# Patient Record
Sex: MALE | Race: WHITE | Employment: FULL TIME | URBAN - NONMETROPOLITAN AREA
[De-identification: names, ages, dates, MRNs, and addresses within clinical notes are randomized per-mention and may not be internally consistent; named-entity substitution may affect disease eponyms.]

---

## 2017-01-09 ENCOUNTER — APPOINTMENT (OUTPATIENT)
Dept: GENERAL RADIOLOGY | Age: 22
End: 2017-01-09
Payer: MEDICAID

## 2017-01-09 ENCOUNTER — HOSPITAL ENCOUNTER (EMERGENCY)
Age: 22
Discharge: HOME | End: 2017-01-10
Attending: EMERGENCY MEDICINE
Payer: MEDICAID

## 2017-01-09 DIAGNOSIS — R07.9 CHEST PAIN, UNSPECIFIED TYPE: Primary | ICD-10-CM

## 2017-01-09 DIAGNOSIS — F19.10 SUBSTANCE ABUSE (HCC): ICD-10-CM

## 2017-01-09 LAB
ALBUMIN SERPL-MCNC: 5.1 G/DL (ref 3.5–5.2)
ALP BLD-CCNC: 78 U/L (ref 40–130)
ALT SERPL-CCNC: 26 U/L (ref 5–41)
AMPHETAMINE SCREEN, URINE: POSITIVE
ANION GAP SERPL CALCULATED.3IONS-SCNC: 23 MMOL/L (ref 7–19)
AST SERPL-CCNC: 36 U/L (ref 5–40)
BARBITURATE SCREEN URINE: NEGATIVE
BASOPHILS ABSOLUTE: 0 K/UL (ref 0–0.2)
BASOPHILS RELATIVE PERCENT: 0.1 % (ref 0–1)
BENZODIAZEPINE SCREEN, URINE: NEGATIVE
BILIRUB SERPL-MCNC: 0.8 MG/DL (ref 0.2–1.2)
BUN BLDV-MCNC: 14 MG/DL (ref 6–20)
CALCIUM SERPL-MCNC: 9.9 MG/DL (ref 8.6–10)
CANNABINOID SCREEN URINE: POSITIVE
CHLORIDE BLD-SCNC: 96 MMOL/L (ref 98–111)
CO2: 21 MMOL/L (ref 22–29)
COCAINE METABOLITE SCREEN URINE: NEGATIVE
CREAT SERPL-MCNC: 0.9 MG/DL (ref 0.5–1.2)
EOSINOPHILS ABSOLUTE: 0.1 K/UL (ref 0–0.6)
EOSINOPHILS RELATIVE PERCENT: 0.6 % (ref 0–5)
ETHANOL: <10 MG/DL (ref 0–0.08)
GFR NON-AFRICAN AMERICAN: >60
GLOBULIN: 2.8 G/DL
GLUCOSE BLD-MCNC: 92 MG/DL (ref 74–109)
HCT VFR BLD CALC: 50.3 % (ref 42–52)
HEMOGLOBIN: 17.7 G/DL (ref 14–18)
LYMPHOCYTES ABSOLUTE: 3.9 K/UL (ref 1.1–4.5)
LYMPHOCYTES RELATIVE PERCENT: 23.7 % (ref 20–40)
Lab: ABNORMAL
MCH RBC QN AUTO: 29.2 PG (ref 27–31)
MCHC RBC AUTO-ENTMCNC: 35.2 G/DL (ref 33–37)
MCV RBC AUTO: 83 FL (ref 80–94)
MONOCYTES ABSOLUTE: 1 K/UL (ref 0–0.9)
MONOCYTES RELATIVE PERCENT: 6 % (ref 0–10)
NEUTROPHILS ABSOLUTE: 11.4 K/UL (ref 1.5–7.5)
NEUTROPHILS RELATIVE PERCENT: 69.4 % (ref 50–65)
OPIATE SCREEN URINE: NEGATIVE
PDW BLD-RTO: 12.1 % (ref 11.5–14.5)
PLATELET # BLD: 300 K/UL (ref 130–400)
PMV BLD AUTO: 9.7 FL (ref 7.4–10.4)
POTASSIUM SERPL-SCNC: 2.9 MMOL/L (ref 3.5–5)
RBC # BLD: 6.06 M/UL (ref 4.7–6.1)
SODIUM BLD-SCNC: 140 MMOL/L (ref 136–145)
TOTAL PROTEIN: 7.9 G/DL (ref 6.6–8.7)
TROPONIN: <0.01 NG/ML (ref 0–0.03)
TSH SERPL DL<=0.05 MIU/L-ACNC: 2.82 UIU/ML (ref 0.27–4.2)
WBC # BLD: 16.4 K/UL (ref 4.8–10.8)

## 2017-01-09 PROCEDURE — 99285 EMERGENCY DEPT VISIT HI MDM: CPT

## 2017-01-09 PROCEDURE — 2580000003 HC RX 258: Performed by: EMERGENCY MEDICINE

## 2017-01-09 PROCEDURE — 6370000000 HC RX 637 (ALT 250 FOR IP): Performed by: EMERGENCY MEDICINE

## 2017-01-09 PROCEDURE — 93005 ELECTROCARDIOGRAM TRACING: CPT

## 2017-01-09 PROCEDURE — 80053 COMPREHEN METABOLIC PANEL: CPT

## 2017-01-09 PROCEDURE — 85025 COMPLETE CBC W/AUTO DIFF WBC: CPT

## 2017-01-09 PROCEDURE — 36415 COLL VENOUS BLD VENIPUNCTURE: CPT

## 2017-01-09 PROCEDURE — G0480 DRUG TEST DEF 1-7 CLASSES: HCPCS

## 2017-01-09 PROCEDURE — 84484 ASSAY OF TROPONIN QUANT: CPT

## 2017-01-09 PROCEDURE — 84443 ASSAY THYROID STIM HORMONE: CPT

## 2017-01-09 PROCEDURE — 71010 XR CHEST PORTABLE: CPT

## 2017-01-09 PROCEDURE — 6360000002 HC RX W HCPCS: Performed by: EMERGENCY MEDICINE

## 2017-01-09 RX ORDER — POTASSIUM CHLORIDE 7.45 MG/ML
10 INJECTION INTRAVENOUS ONCE
Status: COMPLETED | OUTPATIENT
Start: 2017-01-09 | End: 2017-01-09

## 2017-01-09 RX ORDER — ASPIRIN 325 MG
325 TABLET ORAL ONCE
Status: COMPLETED | OUTPATIENT
Start: 2017-01-09 | End: 2017-01-09

## 2017-01-09 RX ORDER — LORAZEPAM 2 MG/ML
1 INJECTION INTRAMUSCULAR ONCE
Status: COMPLETED | OUTPATIENT
Start: 2017-01-09 | End: 2017-01-09

## 2017-01-09 RX ORDER — 0.9 % SODIUM CHLORIDE 0.9 %
1000 INTRAVENOUS SOLUTION INTRAVENOUS ONCE
Status: COMPLETED | OUTPATIENT
Start: 2017-01-09 | End: 2017-01-10

## 2017-01-09 RX ORDER — POTASSIUM CHLORIDE 20 MEQ/1
40 TABLET, EXTENDED RELEASE ORAL ONCE
Status: COMPLETED | OUTPATIENT
Start: 2017-01-09 | End: 2017-01-09

## 2017-01-09 RX ADMIN — LORAZEPAM 1 MG: 2 INJECTION INTRAMUSCULAR; INTRAVENOUS at 21:27

## 2017-01-09 RX ADMIN — SODIUM CHLORIDE 1000 ML: 9 INJECTION, SOLUTION INTRAVENOUS at 21:26

## 2017-01-09 RX ADMIN — POTASSIUM CHLORIDE 10 MEQ: 7.46 INJECTION, SOLUTION INTRAVENOUS at 22:10

## 2017-01-09 RX ADMIN — POTASSIUM CHLORIDE 40 MEQ: 20 TABLET, EXTENDED RELEASE ORAL at 22:10

## 2017-01-09 RX ADMIN — ASPIRIN 325 MG ORAL TABLET 325 MG: 325 PILL ORAL at 21:27

## 2017-01-09 ASSESSMENT — ENCOUNTER SYMPTOMS
COUGH: 0
SHORTNESS OF BREATH: 1

## 2017-01-09 ASSESSMENT — PAIN SCALES - GENERAL: PAINLEVEL_OUTOF10: 10

## 2017-01-10 VITALS
HEIGHT: 67 IN | TEMPERATURE: 98.2 F | SYSTOLIC BLOOD PRESSURE: 118 MMHG | OXYGEN SATURATION: 99 % | DIASTOLIC BLOOD PRESSURE: 80 MMHG | BODY MASS INDEX: 21.97 KG/M2 | WEIGHT: 140 LBS | HEART RATE: 101 BPM | RESPIRATION RATE: 18 BRPM

## 2017-01-10 PROCEDURE — 96365 THER/PROPH/DIAG IV INF INIT: CPT

## 2017-01-10 PROCEDURE — 99283 EMERGENCY DEPT VISIT LOW MDM: CPT | Performed by: EMERGENCY MEDICINE

## 2017-01-10 PROCEDURE — 96375 TX/PRO/DX INJ NEW DRUG ADDON: CPT

## 2017-01-10 PROCEDURE — 80307 DRUG TEST PRSMV CHEM ANLYZR: CPT

## 2017-01-12 LAB
EKG P AXIS: 76 DEGREES
EKG P-R INTERVAL: 112 MS
EKG Q-T INTERVAL: 316 MS
EKG QRS DURATION: 82 MS
EKG QTC CALCULATION (BAZETT): 408 MS
EKG T AXIS: -57 DEGREES

## 2017-02-14 ENCOUNTER — HOSPITAL ENCOUNTER (EMERGENCY)
Facility: HOSPITAL | Age: 22
Discharge: HOME | End: 2017-02-14
Admitting: FAMILY MEDICINE

## 2017-02-14 ENCOUNTER — APPOINTMENT (OUTPATIENT)
Dept: CT IMAGING | Facility: HOSPITAL | Age: 22
End: 2017-02-14

## 2017-02-14 VITALS
HEART RATE: 85 BPM | RESPIRATION RATE: 20 BRPM | SYSTOLIC BLOOD PRESSURE: 122 MMHG | WEIGHT: 145 LBS | BODY MASS INDEX: 22.76 KG/M2 | HEIGHT: 67 IN | OXYGEN SATURATION: 95 % | DIASTOLIC BLOOD PRESSURE: 69 MMHG | TEMPERATURE: 98.3 F

## 2017-02-14 DIAGNOSIS — R10.13 EPIGASTRIC PAIN: Primary | ICD-10-CM

## 2017-02-14 DIAGNOSIS — K52.9 GASTROENTERITIS: ICD-10-CM

## 2017-02-14 LAB
ALBUMIN SERPL-MCNC: 4.8 G/DL (ref 3.5–5)
ALBUMIN/GLOB SERPL: 1.7 G/DL (ref 1.1–2.5)
ALP SERPL-CCNC: 88 U/L (ref 24–120)
ALT SERPL W P-5'-P-CCNC: 35 U/L (ref 0–54)
AMYLASE SERPL-CCNC: 85 U/L (ref 30–110)
ANION GAP SERPL CALCULATED.3IONS-SCNC: 10 MMOL/L (ref 4–13)
AST SERPL-CCNC: 35 U/L (ref 7–45)
BASOPHILS # BLD AUTO: 0.01 10*3/MM3 (ref 0–0.2)
BASOPHILS NFR BLD AUTO: 0.2 % (ref 0–2)
BILIRUB SERPL-MCNC: 0.4 MG/DL (ref 0.1–1)
BILIRUB UR QL STRIP: NEGATIVE
BUN BLD-MCNC: 12 MG/DL (ref 5–21)
BUN/CREAT SERPL: 12.2 (ref 7–25)
CALCIUM SPEC-SCNC: 10.1 MG/DL (ref 8.4–10.4)
CHLORIDE SERPL-SCNC: 99 MMOL/L (ref 98–110)
CLARITY UR: ABNORMAL
CO2 SERPL-SCNC: 31 MMOL/L (ref 24–31)
COLOR UR: YELLOW
CREAT BLD-MCNC: 0.98 MG/DL (ref 0.5–1.4)
DEPRECATED RDW RBC AUTO: 41 FL (ref 40–54)
EOSINOPHIL # BLD AUTO: 0.15 10*3/MM3 (ref 0–0.7)
EOSINOPHIL NFR BLD AUTO: 2.3 % (ref 0–4)
ERYTHROCYTE [DISTWIDTH] IN BLOOD BY AUTOMATED COUNT: 13.3 % (ref 12–15)
GFR SERPL CREATININE-BSD FRML MDRD: 95 ML/MIN/1.73
GLOBULIN UR ELPH-MCNC: 2.8 GM/DL
GLUCOSE BLD-MCNC: 87 MG/DL (ref 70–100)
GLUCOSE UR STRIP-MCNC: NEGATIVE MG/DL
HCT VFR BLD AUTO: 45.7 % (ref 40–52)
HGB BLD-MCNC: 15.9 G/DL (ref 14–18)
HGB UR QL STRIP.AUTO: NEGATIVE
IMM GRANULOCYTES # BLD: 0.01 10*3/MM3 (ref 0–0.03)
IMM GRANULOCYTES NFR BLD: 0.2 % (ref 0–5)
KETONES UR QL STRIP: NEGATIVE
LEUKOCYTE ESTERASE UR QL STRIP.AUTO: NEGATIVE
LIPASE SERPL-CCNC: 82 U/L (ref 23–203)
LYMPHOCYTES # BLD AUTO: 2.39 10*3/MM3 (ref 0.72–4.86)
LYMPHOCYTES NFR BLD AUTO: 36.3 % (ref 15–45)
MCH RBC QN AUTO: 29.6 PG (ref 28–32)
MCHC RBC AUTO-ENTMCNC: 34.8 G/DL (ref 33–36)
MCV RBC AUTO: 85.1 FL (ref 82–95)
MONOCYTES # BLD AUTO: 0.45 10*3/MM3 (ref 0.19–1.3)
MONOCYTES NFR BLD AUTO: 6.8 % (ref 4–12)
NEUTROPHILS # BLD AUTO: 3.58 10*3/MM3 (ref 1.87–8.4)
NEUTROPHILS NFR BLD AUTO: 54.2 % (ref 39–78)
NITRITE UR QL STRIP: NEGATIVE
PH UR STRIP.AUTO: 8 [PH] (ref 5–8)
PLATELET # BLD AUTO: 284 10*3/MM3 (ref 130–400)
PMV BLD AUTO: 9.9 FL (ref 6–12)
POTASSIUM BLD-SCNC: 3.9 MMOL/L (ref 3.5–5.3)
PROT SERPL-MCNC: 7.6 G/DL (ref 6.3–8.7)
PROT UR QL STRIP: ABNORMAL
RBC # BLD AUTO: 5.37 10*6/MM3 (ref 4.8–5.9)
SODIUM BLD-SCNC: 140 MMOL/L (ref 135–145)
SP GR UR STRIP: 1.03 (ref 1–1.03)
UROBILINOGEN UR QL STRIP: ABNORMAL
WBC NRBC COR # BLD: 6.59 10*3/MM3 (ref 4.8–10.8)

## 2017-02-14 PROCEDURE — 80053 COMPREHEN METABOLIC PANEL: CPT | Performed by: NURSE PRACTITIONER

## 2017-02-14 PROCEDURE — 74177 CT ABD & PELVIS W/CONTRAST: CPT

## 2017-02-14 PROCEDURE — 85025 COMPLETE CBC W/AUTO DIFF WBC: CPT | Performed by: NURSE PRACTITIONER

## 2017-02-14 PROCEDURE — 96361 HYDRATE IV INFUSION ADD-ON: CPT

## 2017-02-14 PROCEDURE — 99283 EMERGENCY DEPT VISIT LOW MDM: CPT

## 2017-02-14 PROCEDURE — 25010000002 PROMETHAZINE PER 50 MG: Performed by: NURSE PRACTITIONER

## 2017-02-14 PROCEDURE — 82150 ASSAY OF AMYLASE: CPT | Performed by: NURSE PRACTITIONER

## 2017-02-14 PROCEDURE — 81003 URINALYSIS AUTO W/O SCOPE: CPT | Performed by: NURSE PRACTITIONER

## 2017-02-14 PROCEDURE — 96375 TX/PRO/DX INJ NEW DRUG ADDON: CPT

## 2017-02-14 PROCEDURE — 83690 ASSAY OF LIPASE: CPT | Performed by: NURSE PRACTITIONER

## 2017-02-14 PROCEDURE — 0 IOPAMIDOL 61 % SOLUTION: Performed by: NURSE PRACTITIONER

## 2017-02-14 PROCEDURE — 96374 THER/PROPH/DIAG INJ IV PUSH: CPT

## 2017-02-14 RX ORDER — PANTOPRAZOLE SODIUM 40 MG/10ML
40 INJECTION, POWDER, LYOPHILIZED, FOR SOLUTION INTRAVENOUS ONCE
Status: COMPLETED | OUTPATIENT
Start: 2017-02-14 | End: 2017-02-14

## 2017-02-14 RX ORDER — ONDANSETRON 4 MG/1
4 TABLET, ORALLY DISINTEGRATING ORAL EVERY 6 HOURS PRN
Qty: 10 TABLET | Refills: 0 | Status: SHIPPED | OUTPATIENT
Start: 2017-02-14

## 2017-02-14 RX ORDER — PANTOPRAZOLE SODIUM 20 MG/1
20 TABLET, DELAYED RELEASE ORAL DAILY
Qty: 20 TABLET | Refills: 0 | Status: SHIPPED | OUTPATIENT
Start: 2017-02-14 | End: 2021-08-26

## 2017-02-14 RX ORDER — PROMETHAZINE HYDROCHLORIDE 25 MG/ML
12.5 INJECTION, SOLUTION INTRAMUSCULAR; INTRAVENOUS ONCE
Status: COMPLETED | OUTPATIENT
Start: 2017-02-14 | End: 2017-02-14

## 2017-02-14 RX ADMIN — PANTOPRAZOLE SODIUM 40 MG: 40 INJECTION, POWDER, FOR SOLUTION INTRAVENOUS at 20:09

## 2017-02-14 RX ADMIN — PROMETHAZINE HYDROCHLORIDE 12.5 MG: 25 INJECTION INTRAMUSCULAR; INTRAVENOUS at 20:06

## 2017-02-14 RX ADMIN — SODIUM CHLORIDE 1000 ML: 9 INJECTION, SOLUTION INTRAVENOUS at 20:05

## 2017-02-14 RX ADMIN — IOPAMIDOL 100 ML: 612 INJECTION, SOLUTION INTRAVENOUS at 21:13

## 2017-02-15 LAB
HOLD SPECIMEN: NORMAL
WHOLE BLOOD HOLD SPECIMEN: NORMAL
WHOLE BLOOD HOLD SPECIMEN: NORMAL

## 2017-02-15 NOTE — ED PROVIDER NOTES
Subjective   HPI Comments: Patient's 21-year-old white male presents with upper abdominal pain with nausea that started earlier today.  He states his gotten worse throughout the day.  He states that he has not vomited but has continued to have nausea.  He denies any fever or chills.    Patient is a 21 y.o. male presenting with abdominal pain.   History provided by:  Patient   used: No    Abdominal Pain       Review of Systems   Constitutional: Negative.    HENT: Negative.    Eyes: Negative.    Respiratory: Negative.    Cardiovascular: Negative.    Gastrointestinal: Positive for abdominal pain.        Pt states that he has had upper abd pain that started earlier today. He states that the pain has gotten worse throughout the day and that he has had nausea as well. He denies vomiting. He denies fever or chills.    Endocrine: Negative.    Genitourinary: Negative.    Musculoskeletal: Negative.    Skin: Negative.    Allergic/Immunologic: Negative.    Neurological: Negative.    Hematological: Negative.    Psychiatric/Behavioral: Negative.    All other systems reviewed and are negative.      Past Medical History   Diagnosis Date   • Patient denies medical problems        No Known Allergies    History reviewed. No pertinent past surgical history.    History reviewed. No pertinent family history.    Social History     Social History   • Marital status: Single     Spouse name: N/A   • Number of children: N/A   • Years of education: N/A     Social History Main Topics   • Smoking status: Current Every Day Smoker     Packs/day: 1.00     Types: Cigarettes   • Smokeless tobacco: None   • Alcohol use Yes      Comment: occasional   • Drug use: No   • Sexual activity: Not Asked     Other Topics Concern   • None     Social History Narrative   • None       Prior to Admission medications    Not on File       Visit Vitals   • /69 (BP Location: Right arm, Patient Position: Sitting)   • Pulse 85   • Temp 98.3 °F  "(36.8 °C) (Tympanic)   • Resp 20   • Ht 67\" (170.2 cm)   • Wt 145 lb (65.8 kg)   • SpO2 95%   • BMI 22.71 kg/m2       Objective   Physical Exam   Constitutional: He is oriented to person, place, and time. He appears well-developed and well-nourished. He is active. No distress.   HENT:   Head: Normocephalic. Head is without raccoon's eyes, without Randhawa's sign, without abrasion, without contusion and without laceration.   Right Ear: Tympanic membrane and external ear normal.   Left Ear: Tympanic membrane and external ear normal.   Nose: Nose normal.   Mouth/Throat: Oropharynx is clear and moist.   Eyes: Conjunctivae, EOM and lids are normal. Pupils are equal, round, and reactive to light.   Neck: Trachea normal and normal range of motion. Neck supple. Normal carotid pulses and no JVD present. No spinous process tenderness and no muscular tenderness present. No rigidity. No tracheal deviation and normal range of motion present.   Cardiovascular: Normal rate, regular rhythm, normal heart sounds, intact distal pulses and normal pulses.    Pulmonary/Chest: Effort normal and breath sounds normal. No accessory muscle usage or stridor. No respiratory distress. He exhibits no mass, no tenderness, no bony tenderness, no laceration, no crepitus, no deformity and no swelling.   Abdominal: Soft. Normal appearance, normal aorta and bowel sounds are normal. He exhibits no distension and no pulsatile midline mass. There is no tenderness.   Moderate tenderness midepigastric area, ruq. No guarding or rebound noted. abd soft, nondistended    Musculoskeletal: Normal range of motion. He exhibits no edema, tenderness or deformity.        Cervical back: Normal. He exhibits no tenderness, no bony tenderness, no deformity and no pain.        Thoracic back: Normal. He exhibits no tenderness, no bony tenderness, no pain and no spasm.        Lumbar back: Normal. He exhibits normal range of motion, no tenderness, no bony tenderness and no " deformity.   Neurological: He is alert and oriented to person, place, and time. He has normal strength and normal reflexes. He displays normal reflexes. No cranial nerve deficit or sensory deficit. He exhibits normal muscle tone. GCS eye subscore is 4. GCS verbal subscore is 5. GCS motor subscore is 6.   Skin: Skin is warm, dry and intact. He is not diaphoretic. No pallor.   Psychiatric: He has a normal mood and affect. His speech is normal and behavior is normal.   Nursing note and vitals reviewed.      Procedures         Lab Results (last 24 hours)     Procedure Component Value Units Date/Time    CBC & Differential [[id number]] Collected:  02/14/17 1943    Specimen:  Blood Updated:  02/14/17 2020    Narrative:       The following orders were created for panel order CBC & Differential.  Procedure                               Abnormality         Status                     ---------                               -----------         ------                     CBC Auto Differential[[id number]]         Normal              Final result                 Please view results for these tests on the individual orders.    Comprehensive Metabolic Panel [[id number]] Collected:  02/14/17 1943    Specimen:  Blood from Arm, Right Updated:  02/14/17 2031     Glucose 87 mg/dL      BUN 12 mg/dL      Creatinine 0.98 mg/dL      Sodium 140 mmol/L      Potassium 3.9 mmol/L      Chloride 99 mmol/L      CO2 31.0 mmol/L      Calcium 10.1 mg/dL      Total Protein 7.6 g/dL      Albumin 4.80 g/dL      ALT (SGPT) 35 U/L      AST (SGOT) 35 U/L      Alkaline Phosphatase 88 U/L      Total Bilirubin 0.4 mg/dL      eGFR Non African Amer 95 mL/min/1.73      Globulin 2.8 gm/dL      A/G Ratio 1.7 g/dL      BUN/Creatinine Ratio 12.2      Anion Gap 10.0 mmol/L     Amylase [[id number]]  (Normal) Collected:  02/14/17 1943    Specimen:  Blood from Arm, Right Updated:  02/14/17 2031     Amylase 85 U/L     Lipase [[id number]]  (Normal) Collected:  02/14/17 1943     Specimen:  Blood from Arm, Right Updated:  02/14/17 2031     Lipase 82 U/L     CBC Auto Differential [[id number]]  (Normal) Collected:  02/14/17 1943    Specimen:  Blood from Arm, Right Updated:  02/14/17 2020     WBC 6.59 10*3/mm3      RBC 5.37 10*6/mm3      Hemoglobin 15.9 g/dL      Hematocrit 45.7 %      MCV 85.1 fL      MCH 29.6 pg      MCHC 34.8 g/dL      RDW 13.3 %      RDW-SD 41.0 fl      MPV 9.9 fL      Platelets 284 10*3/mm3      Neutrophil % 54.2 %      Lymphocyte % 36.3 %      Monocyte % 6.8 %      Eosinophil % 2.3 %      Basophil % 0.2 %      Immature Grans % 0.2 %      Neutrophils, Absolute 3.58 10*3/mm3      Lymphocytes, Absolute 2.39 10*3/mm3      Monocytes, Absolute 0.45 10*3/mm3      Eosinophils, Absolute 0.15 10*3/mm3      Basophils, Absolute 0.01 10*3/mm3      Immature Grans, Absolute 0.01 10*3/mm3     Urinalysis With / Culture If Indicated [[id number]]  (Abnormal) Collected:  02/14/17 1946    Specimen:  Urine from Urine, Clean Catch Updated:  02/14/17 2033     Color, UA Yellow      Appearance, UA Turbid (A)      pH, UA 8.0      Specific Gravity, UA 1.026      Glucose, UA Negative      Ketones, UA Negative      Bilirubin, UA Negative      Blood, UA Negative      Protein, UA Trace (A)      Leuk Esterase, UA Negative      Nitrite, UA Negative      Urobilinogen, UA 1.0 E.U./dL     Narrative:       Urine microscopic not indicated.          CT Abdomen Pelvis With Contrast   ED Interpretation   Findings suggestive of mild gastroenteritis, no bowel   obstruction is identified. There is contraction of the gallbladder. The   appendix appears within normal limits. No free fluid is or free air is   identified.   This report was finalized on 02/14/2017 21:32 by Dr. Seth Maldonado MD.      Final Result   Findings suggestive of mild gastroenteritis, no bowel   obstruction is identified. There is contraction of the gallbladder. The   appendix appears within normal limits. No free fluid is or free air is    identified.   This report was finalized on 02/14/2017 21:32 by Dr. Seth Maldonado MD.      US gallbladder    (Results Pending)   NM HIDA scan with pharmacological intervention    (Results Pending)       ED Course  ED Course   Comment By Time   Reeval pt - states that he is feeling much better. No further vomiting since has been in the er. Reviewed test results with pt. Advised to avoid fried or fatty foods. Will order o/p u/s of gallbladder and hida scan  Jackie Stuart, APRROBERT 02/14 8654          MDM  Number of Diagnoses or Management Options  Epigastric pain: new and requires workup  Gastroenteritis: minor     Amount and/or Complexity of Data Reviewed  Clinical lab tests: ordered and reviewed  Tests in the radiology section of CPT®: ordered and reviewed  Independent visualization of images, tracings, or specimens: yes    Risk of Complications, Morbidity, and/or Mortality  Presenting problems: low  Diagnostic procedures: low  Management options: minimal    Patient Progress  Patient progress: stable      Final diagnoses:   Epigastric pain   Gastroenteritis          Jackie Stuart, KEMAR  02/14/17 7880

## 2017-02-25 ENCOUNTER — HOSPITAL ENCOUNTER (EMERGENCY)
Facility: HOSPITAL | Age: 22
Discharge: HOME | End: 2017-02-25
Attending: FAMILY MEDICINE | Admitting: FAMILY MEDICINE

## 2017-02-25 ENCOUNTER — APPOINTMENT (OUTPATIENT)
Dept: GENERAL RADIOLOGY | Facility: HOSPITAL | Age: 22
End: 2017-02-25

## 2017-02-25 VITALS
BODY MASS INDEX: 21.19 KG/M2 | RESPIRATION RATE: 14 BRPM | TEMPERATURE: 98.9 F | SYSTOLIC BLOOD PRESSURE: 122 MMHG | WEIGHT: 135 LBS | HEART RATE: 98 BPM | DIASTOLIC BLOOD PRESSURE: 76 MMHG | HEIGHT: 67 IN | OXYGEN SATURATION: 98 %

## 2017-02-25 DIAGNOSIS — S50.01XA CONTUSION OF RIGHT ELBOW, INITIAL ENCOUNTER: Primary | ICD-10-CM

## 2017-02-25 PROCEDURE — 73090 X-RAY EXAM OF FOREARM: CPT

## 2017-02-25 PROCEDURE — 99283 EMERGENCY DEPT VISIT LOW MDM: CPT

## 2017-02-25 PROCEDURE — 73070 X-RAY EXAM OF ELBOW: CPT

## 2017-02-25 RX ORDER — IBUPROFEN 800 MG/1
800 TABLET ORAL EVERY 8 HOURS PRN
Qty: 25 TABLET | Refills: 0 | Status: SHIPPED | OUTPATIENT
Start: 2017-02-25

## 2018-10-08 ENCOUNTER — APPOINTMENT (OUTPATIENT)
Dept: CT IMAGING | Age: 23
End: 2018-10-08
Payer: MEDICAID

## 2018-10-08 ENCOUNTER — HOSPITAL ENCOUNTER (EMERGENCY)
Age: 23
Discharge: HOME | End: 2018-10-08
Payer: MEDICAID

## 2018-10-08 VITALS
RESPIRATION RATE: 16 BRPM | BODY MASS INDEX: 22.76 KG/M2 | SYSTOLIC BLOOD PRESSURE: 128 MMHG | HEIGHT: 67 IN | TEMPERATURE: 99 F | HEART RATE: 90 BPM | WEIGHT: 145 LBS | DIASTOLIC BLOOD PRESSURE: 80 MMHG | OXYGEN SATURATION: 92 %

## 2018-10-08 DIAGNOSIS — R10.13 ABDOMINAL PAIN, EPIGASTRIC: Primary | ICD-10-CM

## 2018-10-08 DIAGNOSIS — R19.7 DIARRHEA, UNSPECIFIED TYPE: ICD-10-CM

## 2018-10-08 LAB
ALBUMIN SERPL-MCNC: 4.8 G/DL (ref 3.5–5.2)
ALP BLD-CCNC: 65 U/L (ref 40–130)
ALT SERPL-CCNC: 17 U/L (ref 5–41)
ANION GAP SERPL CALCULATED.3IONS-SCNC: 11 MMOL/L (ref 7–19)
AST SERPL-CCNC: 30 U/L (ref 5–40)
BASOPHILS ABSOLUTE: 0 K/UL (ref 0–0.2)
BASOPHILS RELATIVE PERCENT: 0.2 % (ref 0–1)
BILIRUB SERPL-MCNC: 0.4 MG/DL (ref 0.2–1.2)
BILIRUBIN URINE: NEGATIVE
BLOOD, URINE: NEGATIVE
BUN BLDV-MCNC: 13 MG/DL (ref 6–20)
CALCIUM SERPL-MCNC: 9.7 MG/DL (ref 8.6–10)
CHLORIDE BLD-SCNC: 97 MMOL/L (ref 98–111)
CLARITY: CLEAR
CO2: 27 MMOL/L (ref 22–29)
COLOR: YELLOW
CREAT SERPL-MCNC: 0.8 MG/DL (ref 0.5–1.2)
EOSINOPHILS ABSOLUTE: 0 K/UL (ref 0–0.6)
EOSINOPHILS RELATIVE PERCENT: 0.3 % (ref 0–5)
GFR NON-AFRICAN AMERICAN: >60
GLUCOSE BLD-MCNC: 87 MG/DL (ref 74–109)
GLUCOSE URINE: NEGATIVE MG/DL
HCT VFR BLD CALC: 43.9 % (ref 42–52)
HEMOGLOBIN: 14.8 G/DL (ref 14–18)
KETONES, URINE: ABNORMAL MG/DL
LEUKOCYTE ESTERASE, URINE: NEGATIVE
LIPASE: 29 U/L (ref 13–60)
LYMPHOCYTES ABSOLUTE: 1.2 K/UL (ref 1.1–4.5)
LYMPHOCYTES RELATIVE PERCENT: 12.2 % (ref 20–40)
MCH RBC QN AUTO: 28.5 PG (ref 27–31)
MCHC RBC AUTO-ENTMCNC: 33.7 G/DL (ref 33–37)
MCV RBC AUTO: 84.6 FL (ref 80–94)
MONOCYTES ABSOLUTE: 0.6 K/UL (ref 0–0.9)
MONOCYTES RELATIVE PERCENT: 6 % (ref 0–10)
NEUTROPHILS ABSOLUTE: 7.8 K/UL (ref 1.5–7.5)
NEUTROPHILS RELATIVE PERCENT: 81.1 % (ref 50–65)
NITRITE, URINE: NEGATIVE
PDW BLD-RTO: 11.3 % (ref 11.5–14.5)
PH UA: 6
PLATELET # BLD: 225 K/UL (ref 130–400)
PMV BLD AUTO: 9.3 FL (ref 9.4–12.4)
POTASSIUM SERPL-SCNC: 3.8 MMOL/L (ref 3.5–5)
PROTEIN UA: NEGATIVE MG/DL
RBC # BLD: 5.19 M/UL (ref 4.7–6.1)
SODIUM BLD-SCNC: 135 MMOL/L (ref 136–145)
SPECIFIC GRAVITY UA: 1.03
TOTAL PROTEIN: 7.5 G/DL (ref 6.6–8.7)
URINE REFLEX TO CULTURE: ABNORMAL
UROBILINOGEN, URINE: 0.2 E.U./DL
WBC # BLD: 9.7 K/UL (ref 4.8–10.8)

## 2018-10-08 PROCEDURE — 99284 EMERGENCY DEPT VISIT MOD MDM: CPT | Performed by: NURSE PRACTITIONER

## 2018-10-08 PROCEDURE — 2580000003 HC RX 258: Performed by: NURSE PRACTITIONER

## 2018-10-08 PROCEDURE — 74177 CT ABD & PELVIS W/CONTRAST: CPT

## 2018-10-08 PROCEDURE — 80053 COMPREHEN METABOLIC PANEL: CPT

## 2018-10-08 PROCEDURE — 6360000004 HC RX CONTRAST MEDICATION: Performed by: NURSE PRACTITIONER

## 2018-10-08 PROCEDURE — 99284 EMERGENCY DEPT VISIT MOD MDM: CPT

## 2018-10-08 PROCEDURE — 83690 ASSAY OF LIPASE: CPT

## 2018-10-08 PROCEDURE — 36415 COLL VENOUS BLD VENIPUNCTURE: CPT

## 2018-10-08 PROCEDURE — 85025 COMPLETE CBC W/AUTO DIFF WBC: CPT

## 2018-10-08 PROCEDURE — 2500000003 HC RX 250 WO HCPCS: Performed by: NURSE PRACTITIONER

## 2018-10-08 PROCEDURE — 96374 THER/PROPH/DIAG INJ IV PUSH: CPT

## 2018-10-08 PROCEDURE — 81003 URINALYSIS AUTO W/O SCOPE: CPT

## 2018-10-08 PROCEDURE — 96375 TX/PRO/DX INJ NEW DRUG ADDON: CPT

## 2018-10-08 PROCEDURE — 6360000002 HC RX W HCPCS: Performed by: NURSE PRACTITIONER

## 2018-10-08 PROCEDURE — S0028 INJECTION, FAMOTIDINE, 20 MG: HCPCS | Performed by: NURSE PRACTITIONER

## 2018-10-08 RX ORDER — SUCRALFATE ORAL 1 G/10ML
1 SUSPENSION ORAL 4 TIMES DAILY
Qty: 1200 ML | Refills: 3 | Status: SHIPPED | OUTPATIENT
Start: 2018-10-08 | End: 2018-11-11

## 2018-10-08 RX ORDER — 0.9 % SODIUM CHLORIDE 0.9 %
1000 INTRAVENOUS SOLUTION INTRAVENOUS ONCE
Status: COMPLETED | OUTPATIENT
Start: 2018-10-08 | End: 2018-10-08

## 2018-10-08 RX ORDER — ONDANSETRON 4 MG/1
4 TABLET, FILM COATED ORAL EVERY 8 HOURS PRN
Qty: 10 TABLET | Refills: 0 | Status: SHIPPED | OUTPATIENT
Start: 2018-10-08 | End: 2018-11-11

## 2018-10-08 RX ORDER — ONDANSETRON 2 MG/ML
4 INJECTION INTRAMUSCULAR; INTRAVENOUS ONCE
Status: COMPLETED | OUTPATIENT
Start: 2018-10-08 | End: 2018-10-08

## 2018-10-08 RX ORDER — FAMOTIDINE 20 MG/1
20 TABLET, FILM COATED ORAL 2 TIMES DAILY
Qty: 60 TABLET | Refills: 3 | Status: SHIPPED | OUTPATIENT
Start: 2018-10-08 | End: 2018-11-11

## 2018-10-08 RX ORDER — MORPHINE SULFATE/0.9% NACL/PF 1 MG/ML
4 SYRINGE (ML) INJECTION ONCE
Status: COMPLETED | OUTPATIENT
Start: 2018-10-08 | End: 2018-10-08

## 2018-10-08 RX ADMIN — SODIUM CHLORIDE 1000 ML: 9 INJECTION, SOLUTION INTRAVENOUS at 20:51

## 2018-10-08 RX ADMIN — ONDANSETRON 4 MG: 2 INJECTION INTRAMUSCULAR; INTRAVENOUS at 20:41

## 2018-10-08 RX ADMIN — Medication 4 MG: at 20:41

## 2018-10-08 RX ADMIN — FAMOTIDINE 20 MG: 10 INJECTION, SOLUTION INTRAVENOUS at 20:41

## 2018-10-08 RX ADMIN — IOPAMIDOL 90 ML: 755 INJECTION, SOLUTION INTRAVENOUS at 21:28

## 2018-10-08 ASSESSMENT — PAIN SCALES - GENERAL
PAINLEVEL_OUTOF10: 7
PAINLEVEL_OUTOF10: 7
PAINLEVEL_OUTOF10: 3

## 2018-10-08 ASSESSMENT — ENCOUNTER SYMPTOMS
COUGH: 0
DIARRHEA: 1
VOMITING: 0
SHORTNESS OF BREATH: 0
BACK PAIN: 0
WHEEZING: 0
NAUSEA: 0
EYE DISCHARGE: 0
ABDOMINAL PAIN: 1
SORE THROAT: 0

## 2018-10-09 ENCOUNTER — HOSPITAL ENCOUNTER (EMERGENCY)
Age: 23
Discharge: HOME | End: 2018-10-09
Attending: EMERGENCY MEDICINE
Payer: MEDICAID

## 2018-10-09 ENCOUNTER — APPOINTMENT (OUTPATIENT)
Dept: ULTRASOUND IMAGING | Age: 23
End: 2018-10-09
Payer: MEDICAID

## 2018-10-09 VITALS
BODY MASS INDEX: 22.76 KG/M2 | TEMPERATURE: 98.5 F | HEIGHT: 67 IN | OXYGEN SATURATION: 99 % | RESPIRATION RATE: 18 BRPM | HEART RATE: 72 BPM | DIASTOLIC BLOOD PRESSURE: 87 MMHG | WEIGHT: 145 LBS | SYSTOLIC BLOOD PRESSURE: 138 MMHG

## 2018-10-09 DIAGNOSIS — R10.13 ABDOMINAL PAIN, EPIGASTRIC: Primary | ICD-10-CM

## 2018-10-09 LAB
ALBUMIN SERPL-MCNC: 4.1 G/DL (ref 3.5–5.2)
ALP BLD-CCNC: 58 U/L (ref 40–130)
ALT SERPL-CCNC: 14 U/L (ref 5–41)
ANION GAP SERPL CALCULATED.3IONS-SCNC: 11 MMOL/L (ref 7–19)
AST SERPL-CCNC: 22 U/L (ref 5–40)
BASOPHILS ABSOLUTE: 0 K/UL (ref 0–0.2)
BASOPHILS RELATIVE PERCENT: 0.2 % (ref 0–1)
BILIRUB SERPL-MCNC: <0.2 MG/DL (ref 0.2–1.2)
BUN BLDV-MCNC: 13 MG/DL (ref 6–20)
CALCIUM SERPL-MCNC: 9 MG/DL (ref 8.6–10)
CHLORIDE BLD-SCNC: 102 MMOL/L (ref 98–111)
CO2: 26 MMOL/L (ref 22–29)
CREAT SERPL-MCNC: 0.8 MG/DL (ref 0.5–1.2)
EOSINOPHILS ABSOLUTE: 0.1 K/UL (ref 0–0.6)
EOSINOPHILS RELATIVE PERCENT: 1.8 % (ref 0–5)
GFR NON-AFRICAN AMERICAN: >60
GLUCOSE BLD-MCNC: 119 MG/DL (ref 74–109)
HCT VFR BLD CALC: 42 % (ref 42–52)
HEMOGLOBIN: 14 G/DL (ref 14–18)
LIPASE: 37 U/L (ref 13–60)
LYMPHOCYTES ABSOLUTE: 1 K/UL (ref 1.1–4.5)
LYMPHOCYTES RELATIVE PERCENT: 21.5 % (ref 20–40)
MCH RBC QN AUTO: 28.5 PG (ref 27–31)
MCHC RBC AUTO-ENTMCNC: 33.3 G/DL (ref 33–37)
MCV RBC AUTO: 85.4 FL (ref 80–94)
MONOCYTES ABSOLUTE: 0.5 K/UL (ref 0–0.9)
MONOCYTES RELATIVE PERCENT: 10.8 % (ref 0–10)
NEUTROPHILS ABSOLUTE: 3 K/UL (ref 1.5–7.5)
NEUTROPHILS RELATIVE PERCENT: 65.5 % (ref 50–65)
PDW BLD-RTO: 11.6 % (ref 11.5–14.5)
PLATELET # BLD: 196 K/UL (ref 130–400)
PMV BLD AUTO: 9.2 FL (ref 9.4–12.4)
POTASSIUM SERPL-SCNC: 4.3 MMOL/L (ref 3.5–5)
RBC # BLD: 4.92 M/UL (ref 4.7–6.1)
SODIUM BLD-SCNC: 139 MMOL/L (ref 136–145)
TOTAL PROTEIN: 6.6 G/DL (ref 6.6–8.7)
WBC # BLD: 4.5 K/UL (ref 4.8–10.8)

## 2018-10-09 PROCEDURE — 76705 ECHO EXAM OF ABDOMEN: CPT

## 2018-10-09 PROCEDURE — 99284 EMERGENCY DEPT VISIT MOD MDM: CPT | Performed by: EMERGENCY MEDICINE

## 2018-10-09 PROCEDURE — 96374 THER/PROPH/DIAG INJ IV PUSH: CPT

## 2018-10-09 PROCEDURE — 80053 COMPREHEN METABOLIC PANEL: CPT

## 2018-10-09 PROCEDURE — 6370000000 HC RX 637 (ALT 250 FOR IP): Performed by: EMERGENCY MEDICINE

## 2018-10-09 PROCEDURE — 99284 EMERGENCY DEPT VISIT MOD MDM: CPT

## 2018-10-09 PROCEDURE — 85025 COMPLETE CBC W/AUTO DIFF WBC: CPT

## 2018-10-09 PROCEDURE — 36415 COLL VENOUS BLD VENIPUNCTURE: CPT

## 2018-10-09 PROCEDURE — S0028 INJECTION, FAMOTIDINE, 20 MG: HCPCS | Performed by: EMERGENCY MEDICINE

## 2018-10-09 PROCEDURE — 6360000002 HC RX W HCPCS: Performed by: EMERGENCY MEDICINE

## 2018-10-09 PROCEDURE — 83690 ASSAY OF LIPASE: CPT

## 2018-10-09 PROCEDURE — 96375 TX/PRO/DX INJ NEW DRUG ADDON: CPT

## 2018-10-09 PROCEDURE — 2500000003 HC RX 250 WO HCPCS: Performed by: EMERGENCY MEDICINE

## 2018-10-09 RX ORDER — ONDANSETRON 2 MG/ML
4 INJECTION INTRAMUSCULAR; INTRAVENOUS ONCE
Status: COMPLETED | OUTPATIENT
Start: 2018-10-09 | End: 2018-10-09

## 2018-10-09 RX ORDER — MORPHINE SULFATE/0.9% NACL/PF 1 MG/ML
4 SYRINGE (ML) INJECTION ONCE
Status: COMPLETED | OUTPATIENT
Start: 2018-10-09 | End: 2018-10-09

## 2018-10-09 RX ADMIN — Medication 4 MG: at 13:13

## 2018-10-09 RX ADMIN — ONDANSETRON 4 MG: 2 INJECTION INTRAMUSCULAR; INTRAVENOUS at 13:12

## 2018-10-09 RX ADMIN — LIDOCAINE HYDROCHLORIDE: 20 SOLUTION ORAL; TOPICAL at 14:00

## 2018-10-09 RX ADMIN — FAMOTIDINE 20 MG: 10 INJECTION, SOLUTION INTRAVENOUS at 13:13

## 2018-10-09 ASSESSMENT — ENCOUNTER SYMPTOMS
ABDOMINAL PAIN: 1
VOMITING: 0
NAUSEA: 1
RHINORRHEA: 0
SHORTNESS OF BREATH: 0
DIARRHEA: 1
SORE THROAT: 0
BACK PAIN: 0

## 2018-10-09 ASSESSMENT — PAIN DESCRIPTION - PAIN TYPE: TYPE: ACUTE PAIN

## 2018-10-09 ASSESSMENT — PAIN DESCRIPTION - LOCATION: LOCATION: ABDOMEN

## 2018-10-09 ASSESSMENT — PAIN SCALES - GENERAL: PAINLEVEL_OUTOF10: 8

## 2018-10-09 NOTE — ED PROVIDER NOTES
PAST MEDICAL HISTORY     Past Medical History:   Diagnosis Date    Anxiety     H/O lithotripsy     Kidney stone     Tooth ache          SURGICAL HISTORY       Past Surgical History:   Procedure Laterality Date    LITHOTRIPSY      LITHOTRIPSY           CURRENT MEDICATIONS       Previous Medications    FAMOTIDINE (PEPCID) 20 MG TABLET    Take 1 tablet by mouth 2 times daily    ONDANSETRON (ZOFRAN) 4 MG TABLET    Take 1 tablet by mouth every 8 hours as needed for Nausea or Vomiting    SUCRALFATE (CARAFATE) 1 GM/10ML SUSPENSION    Take 10 mLs by mouth 4 times daily       ALLERGIES     Patient has no known allergies. FAMILY HISTORY     History reviewed. No pertinent family history. SOCIAL HISTORY       Social History     Social History    Marital status:      Spouse name: N/A    Number of children: N/A    Years of education: N/A     Social History Main Topics    Smoking status: Current Every Day Smoker     Packs/day: 0.00     Types: E-Cigarettes    Smokeless tobacco: Never Used    Alcohol use No    Drug use: No      Comment: history meth addiction-sober 2 years    Sexual activity: Not Asked     Other Topics Concern    None     Social History Narrative    None       SCREENINGS             PHYSICAL EXAM    (up to 7 for level 4, 8 or more for level 5)     ED Triage Vitals [10/09/18 1213]   BP Temp Temp src Pulse Resp SpO2 Height Weight   (!) 130/93 98.5 °F (36.9 °C) -- 76 18 99 % 5' 7\" (1.702 m) 145 lb (65.8 kg)       Physical Exam   Constitutional: He is oriented to person, place, and time. He appears well-developed and well-nourished. No distress. HENT:   Head: Normocephalic and atraumatic. Eyes: Pupils are equal, round, and reactive to light. Neck: Normal range of motion. Neck supple. Cardiovascular: Normal rate, regular rhythm, normal heart sounds and intact distal pulses. Pulmonary/Chest: Effort normal and breath sounds normal. No respiratory distress. Abdominal: Soft.

## 2018-10-09 NOTE — ED PROVIDER NOTES
[facility] EMERGENCY DEPT  eMERGENCY dEPARTMENT eNCOUnter      Pt Name: Cari Crawford  MRN: [id number]  [place] 1995  Date of evaluation: 10/8/2018  Provider: Toni Rubio, [address]       Chief Complaint   Patient presents with    Abdominal Pain     pain beginning around 1400 today, diarrhea, aching all over         HISTORY OF PRESENT ILLNESS  (Location/Symptom, Timing/Onset, Context/Setting, Quality, Duration, Modifying Factors, Severity.)   Cari Crawford is a 20 y.o. male who presents to the emergency department With chief complaint of mid epigastric abdominal pain that began an hour after eating lunch. Patient reports he had [address] stay and have aspirin casserole at 1 PM and that at 2 PM he began to experience epigastric pain. He reports 3-4 bouts of diarrhea since his epigastric pain. No hematochezia. . No fevers or vomiting. He reports nausea. This is a new chief complaint. The history is provided by the patient. Nursing Notes were reviewed and I agree. REVIEW OF SYSTEMS    (2-9 systems for level 4, 10 or more for level 5)     Review of Systems   Constitutional: Negative for chills and fever. HENT: Negative for congestion, ear pain and sore throat. Eyes: Negative for discharge. Respiratory: Negative for cough, shortness of breath and wheezing. Cardiovascular: Negative for chest pain and palpitations. Gastrointestinal: Positive for abdominal pain and diarrhea. Negative for nausea and vomiting. Genitourinary: Negative for dysuria, frequency, hematuria and urgency. Musculoskeletal: Negative for back pain and neck pain. Skin: Negative for rash. Neurological: Negative for dizziness and headaches. Except as noted above the remainder of the review of systems was reviewed and negative.        PAST MEDICAL HISTORY     Past Medical History:   Diagnosis Date    Anxiety     H/O lithotripsy     Kidney stone     Tooth ache          SURGICAL HISTORY       Past Surgical History:   Procedure Laterality Date    LITHOTRIPSY      LITHOTRIPSY           CURRENT MEDICATIONS       Discharge Medication List as of 10/8/2018 10:02 PM          ALLERGIES     Patient has no known allergies. FAMILY HISTORY     History reviewed. No pertinent family history. SOCIAL HISTORY       Social History     Social History    Marital status:      Spouse name: N/A    Number of children: N/A    Years of education: N/A     Social History Main Topics    Smoking status: Current Every Day Smoker     Packs/day: 0.00     Types: E-Cigarettes    Smokeless tobacco: Never Used    Alcohol use No    Drug use: No      Comment: history meth addiction-sober 2 years    Sexual activity: Not Asked     Other Topics Concern    None     Social History Narrative    None       SCREENINGS    [place] Coma Scale  Eye Opening: Spontaneous  Best Verbal Response: Oriented  Best Motor Response: Obeys commands  Génesis Coma Scale Score: 15      PHYSICAL EXAM    (up to 7 for level 4, 8 or more for level 5)     ED Triage Vitals [10/08/18 1911]   BP Temp Temp src Pulse Resp SpO2 Height Weight   125/80 99.1 °F (37.3 °C) -- 97 18 98 % 5' 7\" (1.702 m) 145 lb (65.8 kg)       Physical Exam   Constitutional: He is oriented to person, place, and time. He appears well-developed and well-nourished. No distress. HENT:   Head: Normocephalic. Right Ear: External ear normal. No drainage. Tympanic membrane is not erythematous. Left Ear: External ear normal. No drainage. Tympanic membrane is not erythematous. Eyes: Pupils are equal, round, and reactive to light. Conjunctivae and EOM are normal.   Neck: Normal range of motion. Cardiovascular: Normal rate, regular rhythm and normal heart sounds. No murmur heard. Pulmonary/Chest: Effort normal and breath sounds normal. No respiratory distress. He has no wheezes. He has no rales. Abdominal: Soft. There is tenderness (RUQ). Musculoskeletal: Normal range of motion.  He exhibits no edema. Lymphadenopathy:     He has no cervical adenopathy. Neurological: He is alert and oriented to person, place, and time. Skin: Skin is warm and dry. No rash noted. He is not diaphoretic. No erythema. No pallor. Psychiatric: He has a normal mood and affect. Nursing note and vitals reviewed. DIAGNOSTIC RESULTS     RADIOLOGY:   Non-plain film images such as CT, Ultrasound and MRI are read by the radiologist. Plain radiographic images are visualized and preliminarily interpreted by No att. providers found with the below findings:        Interpretation per the Radiologist below, if available at the time of this note:    CT ABDOMEN PELVIS W IV CONTRAST Additional Contrast? None   Final Result   1. No acute intra-abdominal or pelvic abnormality is identified. .        Signed by Dr Baldomero Clement on 10/8/2018 9:44 PM          LABS:  Labs Reviewed   CBC WITH AUTO DIFFERENTIAL - Abnormal; Notable for the following:        Result Value    RDW 11.3 (*)     MPV 9.3 (*)     Neutrophils % 81.1 (*)     Lymphocytes % 12.2 (*)     Neutrophils # 7.8 (*)     All other components within normal limits   COMPREHENSIVE METABOLIC PANEL - Abnormal; Notable for the following:     Sodium 135 (*)     Chloride 97 (*)     All other components within normal limits   URINE RT REFLEX TO CULTURE - Abnormal; Notable for the following:     Ketones, Urine TRACE (*)     All other components within normal limits   LIPASE       All other labs were within normal range or not returned as of this dictation.     RE-ASSESSMENT          EMERGENCY DEPARTMENT COURSE and DIFFERENTIAL DIAGNOSIS/MDM:   Vitals:    Vitals:    10/08/18 2051 10/08/18 2101 10/08/18 2138 10/08/18 2200   BP: 122/81 119/78 (!) 131/92 128/80   Pulse:    90   Resp:    16   Temp:    99 °F (37.2 °C)   TempSrc:    Oral   SpO2: 95% 95%  92%   Weight:       Height:               MDM  Number of Diagnoses or Management Options  Abdominal pain, epigastric:   Diarrhea,

## 2018-10-09 NOTE — LETTER
[facility] EMERGENCY DEPT  [facility]  Phone: [phone]               October 9, 2018    Patient: Brian Villafana   YOB: 1995   Date of Visit: 10/9/2018       To Whom It May Concern:    Brian Villafana was seen and treated in our emergency department on 10/9/2018. He may return to work on 10/10/18. No restrictions.       Sincerely,       Haydee Hines RN         Signature:__________________________________

## 2018-11-11 ENCOUNTER — APPOINTMENT (OUTPATIENT)
Dept: GENERAL RADIOLOGY | Age: 23
End: 2018-11-11
Payer: MEDICAID

## 2018-11-11 ENCOUNTER — HOSPITAL ENCOUNTER (EMERGENCY)
Age: 23
Discharge: HOME | End: 2018-11-11
Payer: MEDICAID

## 2018-11-11 VITALS
HEART RATE: 102 BPM | TEMPERATURE: 98.4 F | OXYGEN SATURATION: 97 % | DIASTOLIC BLOOD PRESSURE: 89 MMHG | SYSTOLIC BLOOD PRESSURE: 143 MMHG | RESPIRATION RATE: 18 BRPM

## 2018-11-11 DIAGNOSIS — R93.7 ABNORMAL BONE XRAY: ICD-10-CM

## 2018-11-11 DIAGNOSIS — S93.601A SPRAIN OF RIGHT FOOT, INITIAL ENCOUNTER: Primary | ICD-10-CM
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
## 2018-11-11 PROCEDURE — 99283 EMERGENCY DEPT VISIT LOW MDM: CPT

## 2018-11-11 PROCEDURE — 73630 X-RAY EXAM OF FOOT: CPT

## 2018-11-11 PROCEDURE — 99283 EMERGENCY DEPT VISIT LOW MDM: CPT | Performed by: NURSE PRACTITIONER

## 2018-11-11 PROCEDURE — 90471 IMMUNIZATION ADMIN: CPT | Performed by: NURSE PRACTITIONER

## 2018-11-11 PROCEDURE — 90715 TDAP VACCINE 7 YRS/> IM: CPT | Performed by: NURSE PRACTITIONER

## 2018-11-11 PROCEDURE — 6360000002 HC RX W HCPCS: Performed by: NURSE PRACTITIONER

## 2018-11-11 RX ORDER — NAPROXEN 500 MG/1
500 TABLET ORAL 2 TIMES DAILY
Qty: 20 TABLET | Refills: 0 | Status: SHIPPED | OUTPATIENT
Start: 2018-11-11 | End: 2019-02-24 | Stop reason: ALTCHOICE

## 2018-11-11 RX ADMIN — TETANUS TOXOID, REDUCED DIPHTHERIA TOXOID AND ACELLULAR PERTUSSIS VACCINE, ADSORBED 0.5 ML: 5; 2.5; 8; 8; 2.5 SUSPENSION INTRAMUSCULAR at 15:00

## 2018-11-11 ASSESSMENT — PAIN SCALES - GENERAL: PAINLEVEL_OUTOF10: 10

## 2018-11-11 NOTE — ED PROVIDER NOTES
[facility] EMERGENCY DEPT  eMERGENCYdEPARTMENT eNCOUnter      Pt Name: Ramsey Garcia  MRN: [id number]  [place] 1995  Date of evaluation: 11/11/2018  Provider:Francoise Barker, [address]       Chief Complaint   Patient presents with    Foot Pain     beginning last night         HISTORY OF PRESENT ILLNESS  (Location/Symptom, Timing/Onset, Context/Setting, Quality, Duration, Modifying Factors, Severity.)   Ramsey Garcia is a 20 y.o. male who presents to the emergency department With chief complaint of right dorsal foot pain. Patient reports walking possibly running N/A parking lot last night and stumbling. He reports painful ambulation today. This is any chief complaint. No alleviating factors. The history is provided by the patient. Nursing Notes were reviewed and I agree. REVIEW OF SYSTEMS    (2-9 systems for level 4, 10 or more for level 5)     Review of Systems   Musculoskeletal: Positive for arthralgias (right foot pain) and gait problem (painful ). Skin: Positive for wound (mild abrasion behind the right ear). All other systems reviewed and are negative. Except as noted above the remainder of the review of systems was reviewed and negative. PAST MEDICAL HISTORY     Past Medical History:   Diagnosis Date    Anxiety     H/O lithotripsy     Kidney stone     Tooth ache          SURGICAL HISTORY       Past Surgical History:   Procedure Laterality Date    LITHOTRIPSY      LITHOTRIPSY           CURRENT MEDICATIONS       Discharge Medication List as of 11/11/2018  3:36 PM          ALLERGIES     Patient has no known allergies. FAMILY HISTORY     History reviewed. No pertinent family history.        SOCIAL HISTORY       Social History     Social History    Marital status: Legally      Spouse name: N/A    Number of children: N/A    Years of education: N/A     Social History Main Topics    Smoking status: Current Every Day Smoker     Packs/day: 0.00     Types:

## 2018-11-13 ENCOUNTER — HOSPITAL ENCOUNTER (OUTPATIENT)
Dept: GENERAL RADIOLOGY | Age: 23
Discharge: HOME | End: 2018-11-13
Payer: MEDICAID

## 2018-11-13 ENCOUNTER — OFFICE VISIT (OUTPATIENT)
Dept: URGENT CARE | Age: 23
End: 2018-11-13
Payer: MEDICAID

## 2018-11-13 VITALS
TEMPERATURE: 98.1 F | RESPIRATION RATE: 18 BRPM | DIASTOLIC BLOOD PRESSURE: 82 MMHG | HEART RATE: 82 BPM | HEIGHT: 67 IN | WEIGHT: 145 LBS | OXYGEN SATURATION: 98 % | BODY MASS INDEX: 22.76 KG/M2 | SYSTOLIC BLOOD PRESSURE: 110 MMHG

## 2018-11-13 DIAGNOSIS — M92.71 FREIBERG'S INFRACTION, RIGHT: Primary | ICD-10-CM

## 2018-11-13 DIAGNOSIS — M79.671 RIGHT FOOT PAIN: ICD-10-CM

## 2018-11-13 PROCEDURE — G8420 CALC BMI NORM PARAMETERS: HCPCS | Performed by: SPECIALIST

## 2018-11-13 PROCEDURE — G8427 DOCREV CUR MEDS BY ELIG CLIN: HCPCS | Performed by: SPECIALIST

## 2018-11-13 PROCEDURE — 73620 X-RAY EXAM OF FOOT: CPT

## 2018-11-13 PROCEDURE — 99213 OFFICE O/P EST LOW 20 MIN: CPT | Performed by: SPECIALIST

## 2018-11-13 PROCEDURE — 4004F PT TOBACCO SCREEN RCVD TLK: CPT | Performed by: SPECIALIST

## 2018-11-13 PROCEDURE — G8484 FLU IMMUNIZE NO ADMIN: HCPCS | Performed by: SPECIALIST

## 2018-11-13 RX ORDER — IBUPROFEN 800 MG/1
800 TABLET ORAL EVERY 8 HOURS PRN
Qty: 120 TABLET | Refills: 0 | Status: SHIPPED | OUTPATIENT
Start: 2018-11-13 | End: 2019-02-24 | Stop reason: ALTCHOICE

## 2019-02-24 ENCOUNTER — HOSPITAL ENCOUNTER (EMERGENCY)
Age: 24
Discharge: HOME | End: 2019-02-24
Attending: EMERGENCY MEDICINE
Payer: MEDICAID

## 2019-02-24 ENCOUNTER — APPOINTMENT (OUTPATIENT)
Dept: CT IMAGING | Age: 24
End: 2019-02-24
Payer: MEDICAID

## 2019-02-24 VITALS
SYSTOLIC BLOOD PRESSURE: 116 MMHG | OXYGEN SATURATION: 100 % | TEMPERATURE: 98 F | WEIGHT: 145 LBS | DIASTOLIC BLOOD PRESSURE: 80 MMHG | HEIGHT: 68 IN | RESPIRATION RATE: 20 BRPM | BODY MASS INDEX: 21.98 KG/M2 | HEART RATE: 77 BPM

## 2019-02-24 DIAGNOSIS — R10.10 PAIN OF UPPER ABDOMEN: Primary | ICD-10-CM

## 2019-02-24 LAB
ALBUMIN SERPL-MCNC: 5.2 G/DL (ref 3.5–5.2)
ALP BLD-CCNC: 71 U/L (ref 40–130)
ALT SERPL-CCNC: 15 U/L (ref 5–41)
ANION GAP SERPL CALCULATED.3IONS-SCNC: 11 MMOL/L (ref 7–19)
AST SERPL-CCNC: 22 U/L (ref 5–40)
BASOPHILS ABSOLUTE: 0 K/UL (ref 0–0.2)
BASOPHILS RELATIVE PERCENT: 0.2 % (ref 0–1)
BILIRUB SERPL-MCNC: 0.7 MG/DL (ref 0.2–1.2)
BILIRUBIN URINE: NEGATIVE
BLOOD, URINE: NEGATIVE
BUN BLDV-MCNC: 12 MG/DL (ref 6–20)
CALCIUM SERPL-MCNC: 10.1 MG/DL (ref 8.6–10)
CHLORIDE BLD-SCNC: 101 MMOL/L (ref 98–111)
CLARITY: CLEAR
CO2: 28 MMOL/L (ref 22–29)
COLOR: YELLOW
CREAT SERPL-MCNC: 0.9 MG/DL (ref 0.5–1.2)
EOSINOPHILS ABSOLUTE: 0 K/UL (ref 0–0.6)
EOSINOPHILS RELATIVE PERCENT: 0.7 % (ref 0–5)
GFR NON-AFRICAN AMERICAN: >60
GLUCOSE BLD-MCNC: 85 MG/DL (ref 74–109)
GLUCOSE URINE: NEGATIVE MG/DL
HCT VFR BLD CALC: 52.7 % (ref 42–52)
HEMOGLOBIN: 17.5 G/DL (ref 14–18)
KETONES, URINE: NEGATIVE MG/DL
LEUKOCYTE ESTERASE, URINE: NEGATIVE
LIPASE: 28 U/L (ref 13–60)
LYMPHOCYTES ABSOLUTE: 1.9 K/UL (ref 1.1–4.5)
LYMPHOCYTES RELATIVE PERCENT: 32.1 % (ref 20–40)
MCH RBC QN AUTO: 28.3 PG (ref 27–31)
MCHC RBC AUTO-ENTMCNC: 33.2 G/DL (ref 33–37)
MCV RBC AUTO: 85.1 FL (ref 80–94)
MONOCYTES ABSOLUTE: 0.5 K/UL (ref 0–0.9)
MONOCYTES RELATIVE PERCENT: 8.9 % (ref 0–10)
NEUTROPHILS ABSOLUTE: 3.3 K/UL (ref 1.5–7.5)
NEUTROPHILS RELATIVE PERCENT: 57.9 % (ref 50–65)
NITRITE, URINE: NEGATIVE
PDW BLD-RTO: 11.4 % (ref 11.5–14.5)
PH UA: 6.5
PLATELET # BLD: 298 K/UL (ref 130–400)
PMV BLD AUTO: 9 FL (ref 9.4–12.4)
POTASSIUM REFLEX MAGNESIUM: 4 MMOL/L (ref 3.5–5)
PROTEIN UA: NEGATIVE MG/DL
RBC # BLD: 6.19 M/UL (ref 4.7–6.1)
SODIUM BLD-SCNC: 140 MMOL/L (ref 136–145)
SPECIFIC GRAVITY UA: 1.01
TOTAL PROTEIN: 8.2 G/DL (ref 6.6–8.7)
URINE REFLEX TO CULTURE: NORMAL
UROBILINOGEN, URINE: 0.2 E.U./DL
WBC # BLD: 5.8 K/UL (ref 4.8–10.8)

## 2019-02-24 PROCEDURE — 36415 COLL VENOUS BLD VENIPUNCTURE: CPT

## 2019-02-24 PROCEDURE — 96375 TX/PRO/DX INJ NEW DRUG ADDON: CPT

## 2019-02-24 PROCEDURE — 99284 EMERGENCY DEPT VISIT MOD MDM: CPT | Performed by: EMERGENCY MEDICINE

## 2019-02-24 PROCEDURE — 99284 EMERGENCY DEPT VISIT MOD MDM: CPT

## 2019-02-24 PROCEDURE — 96374 THER/PROPH/DIAG INJ IV PUSH: CPT

## 2019-02-24 PROCEDURE — 83690 ASSAY OF LIPASE: CPT

## 2019-02-24 PROCEDURE — 6360000002 HC RX W HCPCS: Performed by: EMERGENCY MEDICINE

## 2019-02-24 PROCEDURE — 74177 CT ABD & PELVIS W/CONTRAST: CPT

## 2019-02-24 PROCEDURE — 80053 COMPREHEN METABOLIC PANEL: CPT

## 2019-02-24 PROCEDURE — 85025 COMPLETE CBC W/AUTO DIFF WBC: CPT

## 2019-02-24 PROCEDURE — 6360000004 HC RX CONTRAST MEDICATION: Performed by: EMERGENCY MEDICINE

## 2019-02-24 PROCEDURE — 81003 URINALYSIS AUTO W/O SCOPE: CPT

## 2019-02-24 RX ORDER — KETOROLAC TROMETHAMINE 30 MG/ML
30 INJECTION, SOLUTION INTRAMUSCULAR; INTRAVENOUS ONCE
Status: COMPLETED | OUTPATIENT
Start: 2019-02-24 | End: 2019-02-24

## 2019-02-24 RX ORDER — TRAMADOL HYDROCHLORIDE 50 MG/1
50 TABLET ORAL EVERY 4 HOURS PRN
Qty: 18 TABLET | Refills: 0 | Status: SHIPPED | OUTPATIENT
Start: 2019-02-24 | End: 2019-02-27

## 2019-02-24 RX ORDER — ONDANSETRON 2 MG/ML
4 INJECTION INTRAMUSCULAR; INTRAVENOUS ONCE
Status: COMPLETED | OUTPATIENT
Start: 2019-02-24 | End: 2019-02-24

## 2019-02-24 RX ADMIN — IOPAMIDOL 90 ML: 755 INJECTION, SOLUTION INTRAVENOUS at 15:00

## 2019-02-24 RX ADMIN — KETOROLAC TROMETHAMINE 30 MG: 30 INJECTION, SOLUTION INTRAMUSCULAR at 14:51

## 2019-02-24 RX ADMIN — ONDANSETRON 4 MG: 2 INJECTION INTRAMUSCULAR; INTRAVENOUS at 14:50

## 2019-02-24 ASSESSMENT — ENCOUNTER SYMPTOMS
CONSTIPATION: 1
SORE THROAT: 0
ABDOMINAL PAIN: 1
VOMITING: 1
DIARRHEA: 0
NAUSEA: 1

## 2019-02-24 ASSESSMENT — PAIN SCALES - GENERAL
PAINLEVEL_OUTOF10: 0
PAINLEVEL_OUTOF10: 5
PAINLEVEL_OUTOF10: 5

## 2019-03-05 ENCOUNTER — OFFICE VISIT (OUTPATIENT)
Dept: URGENT CARE | Age: 24
End: 2019-03-05
Payer: MEDICAID

## 2019-03-05 VITALS
SYSTOLIC BLOOD PRESSURE: 140 MMHG | OXYGEN SATURATION: 98 % | HEART RATE: 62 BPM | WEIGHT: 141 LBS | BODY MASS INDEX: 21.44 KG/M2 | TEMPERATURE: 99.7 F | DIASTOLIC BLOOD PRESSURE: 82 MMHG | RESPIRATION RATE: 20 BRPM

## 2019-03-05 DIAGNOSIS — R52 BODY ACHES: ICD-10-CM

## 2019-03-05 DIAGNOSIS — J06.9 URI, ACUTE: Primary | ICD-10-CM

## 2019-03-05 LAB
INFLUENZA A ANTIBODY: NEGATIVE
INFLUENZA B ANTIBODY: NEGATIVE
S PYO AG THROAT QL: NORMAL

## 2019-03-05 PROCEDURE — 99213 OFFICE O/P EST LOW 20 MIN: CPT | Performed by: SPECIALIST

## 2019-03-05 PROCEDURE — 87880 STREP A ASSAY W/OPTIC: CPT | Performed by: SPECIALIST

## 2019-03-05 PROCEDURE — G8427 DOCREV CUR MEDS BY ELIG CLIN: HCPCS | Performed by: SPECIALIST

## 2019-03-05 PROCEDURE — 87804 INFLUENZA ASSAY W/OPTIC: CPT | Performed by: SPECIALIST

## 2019-03-05 PROCEDURE — G8484 FLU IMMUNIZE NO ADMIN: HCPCS | Performed by: SPECIALIST

## 2019-03-05 PROCEDURE — 4004F PT TOBACCO SCREEN RCVD TLK: CPT | Performed by: SPECIALIST

## 2019-03-05 PROCEDURE — G8420 CALC BMI NORM PARAMETERS: HCPCS | Performed by: SPECIALIST

## 2019-03-05 RX ORDER — AZITHROMYCIN 250 MG/1
250 TABLET, FILM COATED ORAL SEE ADMIN INSTRUCTIONS
Qty: 6 TABLET | Refills: 0 | Status: SHIPPED | OUTPATIENT
Start: 2019-03-05 | End: 2019-03-10

## 2019-03-05 ASSESSMENT — ENCOUNTER SYMPTOMS
COUGH: 1
NAUSEA: 1
SORE THROAT: 1

## 2019-12-21 ENCOUNTER — OFFICE VISIT (OUTPATIENT)
Dept: URGENT CARE | Age: 24
End: 2019-12-21
Payer: COMMERCIAL

## 2019-12-21 VITALS
HEART RATE: 68 BPM | TEMPERATURE: 98.5 F | WEIGHT: 137 LBS | RESPIRATION RATE: 18 BRPM | SYSTOLIC BLOOD PRESSURE: 138 MMHG | BODY MASS INDEX: 22.02 KG/M2 | DIASTOLIC BLOOD PRESSURE: 82 MMHG | HEIGHT: 66 IN | OXYGEN SATURATION: 98 %

## 2019-12-21 DIAGNOSIS — J04.0 LARYNGITIS: Primary | ICD-10-CM

## 2019-12-21 DIAGNOSIS — J02.9 SORE THROAT: ICD-10-CM

## 2019-12-21 DIAGNOSIS — R05.9 COUGH: ICD-10-CM

## 2019-12-21 LAB
INFLUENZA A ANTIBODY: NEGATIVE
INFLUENZA B ANTIBODY: NEGATIVE
S PYO AG THROAT QL: NORMAL

## 2019-12-21 PROCEDURE — 87880 STREP A ASSAY W/OPTIC: CPT | Performed by: SPECIALIST

## 2019-12-21 PROCEDURE — 87804 INFLUENZA ASSAY W/OPTIC: CPT | Performed by: SPECIALIST

## 2019-12-21 PROCEDURE — 96372 THER/PROPH/DIAG INJ SC/IM: CPT | Performed by: SPECIALIST

## 2019-12-21 PROCEDURE — 99213 OFFICE O/P EST LOW 20 MIN: CPT | Performed by: SPECIALIST

## 2019-12-21 RX ORDER — AZITHROMYCIN 250 MG/1
TABLET, FILM COATED ORAL
Qty: 1 PACKET | Refills: 0 | Status: SHIPPED | OUTPATIENT
Start: 2019-12-21 | End: 2019-12-31

## 2019-12-21 RX ORDER — DEXAMETHASONE SODIUM PHOSPHATE 10 MG/ML
5 INJECTION INTRAMUSCULAR; INTRAVENOUS ONCE
Status: COMPLETED | OUTPATIENT
Start: 2019-12-21 | End: 2019-12-21

## 2019-12-21 RX ADMIN — DEXAMETHASONE SODIUM PHOSPHATE 5 MG: 10 INJECTION INTRAMUSCULAR; INTRAVENOUS at 15:49

## 2019-12-21 ASSESSMENT — ENCOUNTER SYMPTOMS
COUGH: 1
SORE THROAT: 1

## 2020-01-10 ENCOUNTER — HOSPITAL ENCOUNTER (OUTPATIENT)
Dept: GENERAL RADIOLOGY | Facility: HOSPITAL | Age: 25
Discharge: HOME | End: 2020-01-10

## 2020-01-10 ENCOUNTER — TRANSCRIBE ORDERS (OUTPATIENT)
Dept: ADMINISTRATIVE | Facility: HOSPITAL | Age: 25
End: 2020-01-10

## 2020-01-10 DIAGNOSIS — Z02.1 PRE-EMPLOYMENT EXAMINATION: Primary | ICD-10-CM

## 2020-01-10 DIAGNOSIS — Z02.1 PRE-EMPLOYMENT EXAMINATION: ICD-10-CM

## 2020-01-10 PROCEDURE — 71045 X-RAY EXAM CHEST 1 VIEW: CPT

## 2020-09-09 ENCOUNTER — OFFICE VISIT (OUTPATIENT)
Dept: URGENT CARE | Age: 25
End: 2020-09-09
Payer: COMMERCIAL

## 2020-09-09 VITALS
HEART RATE: 77 BPM | SYSTOLIC BLOOD PRESSURE: 136 MMHG | RESPIRATION RATE: 18 BRPM | OXYGEN SATURATION: 98 % | DIASTOLIC BLOOD PRESSURE: 82 MMHG | TEMPERATURE: 96.8 F

## 2020-09-09 PROCEDURE — 99213 OFFICE O/P EST LOW 20 MIN: CPT | Performed by: NURSE PRACTITIONER

## 2020-09-09 RX ORDER — FLUTICASONE PROPIONATE 50 MCG
1 SPRAY, SUSPENSION (ML) NASAL DAILY
Qty: 2 BOTTLE | Refills: 1 | Status: SHIPPED | OUTPATIENT
Start: 2020-09-09 | End: 2021-11-04

## 2020-09-09 RX ORDER — CETIRIZINE HYDROCHLORIDE 10 MG/1
10 TABLET ORAL DAILY
Qty: 30 TABLET | Refills: 0 | Status: SHIPPED | OUTPATIENT
Start: 2020-09-09 | End: 2021-11-04

## 2020-09-09 RX ORDER — METHYLPREDNISOLONE 4 MG/1
TABLET ORAL
Qty: 1 KIT | Refills: 0 | Status: SHIPPED | OUTPATIENT
Start: 2020-09-09 | End: 2020-09-15

## 2020-09-09 RX ORDER — GUAIFENESIN, PSEUDOEPHEDRINE HYDROCHLORIDE 600; 60 MG/1; MG/1
1 TABLET, EXTENDED RELEASE ORAL EVERY 12 HOURS
Qty: 14 TABLET | Refills: 0 | Status: SHIPPED | OUTPATIENT
Start: 2020-09-09 | End: 2020-09-16

## 2020-09-09 NOTE — PROGRESS NOTES
signs and nursing note reviewed. Constitutional:       Appearance: He is well-developed. HENT:      Head: Normocephalic and atraumatic. Right Ear: Hearing, tympanic membrane, ear canal and external ear normal.      Left Ear: Hearing, tympanic membrane, ear canal and external ear normal.      Nose:      Right Sinus: Maxillary sinus tenderness and frontal sinus tenderness present. Left Sinus: Maxillary sinus tenderness and frontal sinus tenderness present. Mouth/Throat:      Mouth: Mucous membranes are moist.      Pharynx: Oropharynx is clear. Uvula midline. No posterior oropharyngeal erythema. Tonsils: No tonsillar exudate. 0 on the right. 0 on the left. Eyes:      Conjunctiva/sclera: Conjunctivae normal.   Neck:      Musculoskeletal: Normal range of motion. Cardiovascular:      Rate and Rhythm: Normal rate and regular rhythm. Pulmonary:      Effort: Pulmonary effort is normal.      Breath sounds: Normal breath sounds. Abdominal:      Palpations: Abdomen is soft. Lymphadenopathy:      Head:      Right side of head: No submental, submandibular, tonsillar, preauricular, posterior auricular or occipital adenopathy. Left side of head: No submental, submandibular, tonsillar, preauricular, posterior auricular or occipital adenopathy. Skin:     General: Skin is warm and moist.      Capillary Refill: Capillary refill takes less than 2 seconds. Findings: No rash. Neurological:      Mental Status: He is alert and oriented to person, place, and time. Psychiatric:         Speech: Speech normal.         Behavior: Behavior normal.         Thought Content: Thought content normal.         Judgment: Judgment normal.       /82   Pulse 77   Temp 96.8 °F (36 °C)   Resp 18   SpO2 98%   No results found for this visit on 09/09/20. Assessment/ Plan     ASSESSMENT:         Diagnosis Orders   1.  Acute sinusitis, recurrence not specified, unspecified location  cetirizine (ZYRTEC ALLERGY) 10 MG tablet    fluticasone (FLONASE) 50 MCG/ACT nasal spray    pseudoephedrine-guaiFENesin (MUCINEX D)  MG per extended release tablet       PLAN: Treat as URI non bacterial at this time since just started yesterday. Advised to follow up if symptoms worsen or fail to improve. Pt agreed to plan. No follow-ups on file. Patient given educational materials - see patient instructions. Discussed use, benefit, and side effects of prescribed medications. All patient questions answered. Pt voiced understanding. Patient agreed with treatment plan.  Follow up as needed      Electronically signed by NO Palomares CNP on 9/9/2020 at 5:02 PM

## 2021-03-09 ENCOUNTER — HOSPITAL ENCOUNTER (EMERGENCY)
Facility: HOSPITAL | Age: 26
Discharge: HOME | End: 2021-03-09
Admitting: EMERGENCY MEDICINE

## 2021-03-09 ENCOUNTER — APPOINTMENT (OUTPATIENT)
Dept: GENERAL RADIOLOGY | Facility: HOSPITAL | Age: 26
End: 2021-03-09

## 2021-03-09 VITALS
HEART RATE: 84 BPM | HEIGHT: 66 IN | RESPIRATION RATE: 16 BRPM | OXYGEN SATURATION: 99 % | BODY MASS INDEX: 21.86 KG/M2 | WEIGHT: 136 LBS | DIASTOLIC BLOOD PRESSURE: 88 MMHG | TEMPERATURE: 98.2 F | SYSTOLIC BLOOD PRESSURE: 122 MMHG

## 2021-03-09 DIAGNOSIS — S62.664A CLOSED NONDISPLACED FRACTURE OF DISTAL PHALANX OF RIGHT RING FINGER, INITIAL ENCOUNTER: Primary | ICD-10-CM

## 2021-03-09 PROCEDURE — 73140 X-RAY EXAM OF FINGER(S): CPT

## 2021-03-09 PROCEDURE — 99283 EMERGENCY DEPT VISIT LOW MDM: CPT

## 2021-03-09 RX ORDER — HYDROCODONE BITARTRATE AND ACETAMINOPHEN 5; 325 MG/1; MG/1
1 TABLET ORAL ONCE
Status: COMPLETED | OUTPATIENT
Start: 2021-03-09 | End: 2021-03-09

## 2021-03-09 RX ORDER — HYDROCODONE BITARTRATE AND ACETAMINOPHEN 5; 325 MG/1; MG/1
1 TABLET ORAL EVERY 6 HOURS PRN
Qty: 12 TABLET | Refills: 0 | Status: SHIPPED | OUTPATIENT
Start: 2021-03-09 | End: 2021-08-26

## 2021-03-09 RX ADMIN — HYDROCODONE BITARTRATE AND ACETAMINOPHEN 1 TABLET: 5; 325 TABLET ORAL at 17:07

## 2021-03-09 NOTE — ED PROVIDER NOTES
Subjective   Pt is a 25 year old  male who presents with complaints of middle finger injury.  The pt reports that he closed the car door on his finger this morning around 0730 after he got off work, and then pulled it out from the door.  The pt states he was able to lay down today to rest but was awoken with pain and throbbing.  No bleeding or open laceration present, there is mild swelling and bruising noted to the tip of the finger.  Pt reports the pain and swelling increased throughout the day.  He does report taking 2 600 mg ibuprofen at 1230 prior to arrival with no relief.        History provided by:  Patient   used: No        Review of Systems   Constitutional: Negative.  Negative for diaphoresis and fatigue.   HENT: Negative.    Eyes: Negative.    Respiratory: Negative.  Negative for shortness of breath.    Cardiovascular: Negative.  Negative for chest pain.   Gastrointestinal: Negative.  Negative for abdominal pain.   Endocrine: Negative.    Genitourinary: Negative.    Musculoskeletal: Positive for arthralgias and joint swelling.   Skin: Positive for color change.        Bruising to right middle finger   Allergic/Immunologic: Negative.    Neurological: Negative.  Negative for dizziness, weakness and light-headedness.   Hematological: Negative.    Psychiatric/Behavioral: Negative.  Negative for agitation.   All other systems reviewed and are negative.      Past Medical History:   Diagnosis Date   • Patient denies medical problems        No Known Allergies    History reviewed. No pertinent surgical history.    History reviewed. No pertinent family history.    Social History     Socioeconomic History   • Marital status: Single     Spouse name: Not on file   • Number of children: Not on file   • Years of education: Not on file   • Highest education level: Not on file   Tobacco Use   • Smoking status: Current Every Day Smoker     Packs/day: 1.00     Types: Cigarettes   Substance and  Sexual Activity   • Alcohol use: Yes     Comment: occasional   • Drug use: No           Objective   Physical Exam  Vitals and nursing note reviewed.   Constitutional:       General: He is not in acute distress.     Appearance: Normal appearance. He is normal weight. He is not ill-appearing, toxic-appearing or diaphoretic.   HENT:      Head: Normocephalic and atraumatic.      Nose: Nose normal.      Mouth/Throat:      Mouth: Mucous membranes are moist.      Pharynx: Oropharynx is clear. No oropharyngeal exudate or posterior oropharyngeal erythema.   Eyes:      Extraocular Movements: Extraocular movements intact.      Conjunctiva/sclera: Conjunctivae normal.      Pupils: Pupils are equal, round, and reactive to light.   Cardiovascular:      Rate and Rhythm: Normal rate and regular rhythm.      Pulses: Normal pulses.      Heart sounds: Normal heart sounds.   Pulmonary:      Effort: Pulmonary effort is normal.      Breath sounds: Normal breath sounds.   Abdominal:      General: Abdomen is flat. Bowel sounds are normal.      Palpations: Abdomen is soft.   Musculoskeletal:         General: Swelling and signs of injury present.      Cervical back: Normal range of motion.      Comments: Right middle finger swelling and bruising noted to tuft aspect. Flexion/extension intact. No subungual hematoma      Skin:     General: Skin is warm and dry.      Capillary Refill: Capillary refill takes less than 2 seconds.      Findings: Bruising present.      Comments: Right middle finger   Neurological:      General: No focal deficit present.      Mental Status: He is alert and oriented to person, place, and time.   Psychiatric:         Mood and Affect: Mood normal.         Behavior: Behavior normal.         Thought Content: Thought content normal.         Judgment: Judgment normal.         Procedures           ED Course  ED Course as of Mar 09 1901   Tu Mar 09, 2021   1725 Reviewed results of testing with pt - will place in finger  splint. Advised to apply ice. Advised to follow up with dr garcia. Will be discharged home shortly in stable condition     [CW]      ED Course User Index  [CW] Jackie Stuart APRN                                           MDM  Number of Diagnoses or Management Options  Closed nondisplaced fracture of distal phalanx of right ring finger, initial encounter: minor     Amount and/or Complexity of Data Reviewed  Tests in the radiology section of CPT®: ordered and reviewed    Patient Progress  Patient progress: stable      Final diagnoses:   Closed nondisplaced fracture of distal phalanx of right ring finger, initial encounter            Jackie Stuart, KEMAR  03/09/21 0944

## 2021-08-26 PROCEDURE — U0004 COV-19 TEST NON-CDC HGH THRU: HCPCS | Performed by: NURSE PRACTITIONER

## 2021-11-04 ENCOUNTER — APPOINTMENT (OUTPATIENT)
Dept: CT IMAGING | Age: 26
End: 2021-11-04

## 2021-11-04 ENCOUNTER — APPOINTMENT (OUTPATIENT)
Dept: GENERAL RADIOLOGY | Age: 26
End: 2021-11-04

## 2021-11-04 ENCOUNTER — HOSPITAL ENCOUNTER (EMERGENCY)
Age: 26
Discharge: HOME | End: 2021-11-05
Payer: COMMERCIAL

## 2021-11-04 DIAGNOSIS — S22.008A CLOSED FRACTURE OF SPINOUS PROCESS OF THORACIC VERTEBRA, INITIAL ENCOUNTER (HCC): Primary | ICD-10-CM

## 2021-11-04 LAB — TROPONIN: <0.01 NG/ML (ref 0–0.03)

## 2021-11-04 PROCEDURE — 6360000002 HC RX W HCPCS: Performed by: NURSE PRACTITIONER

## 2021-11-04 PROCEDURE — 93005 ELECTROCARDIOGRAM TRACING: CPT | Performed by: NURSE PRACTITIONER

## 2021-11-04 PROCEDURE — 71045 X-RAY EXAM CHEST 1 VIEW: CPT

## 2021-11-04 PROCEDURE — 96375 TX/PRO/DX INJ NEW DRUG ADDON: CPT

## 2021-11-04 PROCEDURE — 99283 EMERGENCY DEPT VISIT LOW MDM: CPT

## 2021-11-04 PROCEDURE — 96374 THER/PROPH/DIAG INJ IV PUSH: CPT

## 2021-11-04 PROCEDURE — 72128 CT CHEST SPINE W/O DYE: CPT

## 2021-11-04 RX ORDER — HYDROCODONE BITARTRATE AND ACETAMINOPHEN 10; 325 MG/1; MG/1
1 TABLET ORAL EVERY 6 HOURS PRN
Qty: 12 TABLET | Refills: 0 | Status: SHIPPED | OUTPATIENT
Start: 2021-11-04 | End: 2021-11-08 | Stop reason: SDUPTHER

## 2021-11-04 RX ORDER — NAPROXEN 500 MG/1
500 TABLET ORAL 2 TIMES DAILY
Qty: 20 TABLET | Refills: 0 | Status: SHIPPED | OUTPATIENT
Start: 2021-11-04 | End: 2021-11-08 | Stop reason: SDUPTHER

## 2021-11-04 RX ORDER — MORPHINE SULFATE 4 MG/ML
4 INJECTION, SOLUTION INTRAMUSCULAR; INTRAVENOUS ONCE
Status: COMPLETED | OUTPATIENT
Start: 2021-11-04 | End: 2021-11-05

## 2021-11-04 RX ORDER — KETOROLAC TROMETHAMINE 30 MG/ML
30 INJECTION, SOLUTION INTRAMUSCULAR; INTRAVENOUS ONCE
Status: COMPLETED | OUTPATIENT
Start: 2021-11-04 | End: 2021-11-04

## 2021-11-04 RX ORDER — ORPHENADRINE CITRATE 30 MG/ML
60 INJECTION INTRAMUSCULAR; INTRAVENOUS ONCE
Status: COMPLETED | OUTPATIENT
Start: 2021-11-04 | End: 2021-11-04

## 2021-11-04 ASSESSMENT — PAIN SCALES - GENERAL
PAINLEVEL_OUTOF10: 10
PAINLEVEL_OUTOF10: 10

## 2021-11-04 ASSESSMENT — ENCOUNTER SYMPTOMS
BACK PAIN: 1
ABDOMINAL PAIN: 0

## 2021-11-04 ASSESSMENT — PAIN DESCRIPTION - DESCRIPTORS: DESCRIPTORS: THROBBING

## 2021-11-04 ASSESSMENT — PAIN DESCRIPTION - LOCATION: LOCATION: BACK

## 2021-11-04 NOTE — Clinical Note
Nelda Feliz was seen and treated in our emergency department on 11/4/2021. He may return to work on 11/09/2021. If you have any questions or concerns, please don't hesitate to call.       Jennifer Bhandari, APRN

## 2021-11-04 NOTE — Clinical Note
Monica Maldonado was seen and treated in our emergency department on 11/4/2021. He may return to work on 11/09/2021. If you have any questions or concerns, please don't hesitate to call.       NO Holley

## 2021-11-05 VITALS
HEART RATE: 76 BPM | HEIGHT: 67 IN | RESPIRATION RATE: 18 BRPM | OXYGEN SATURATION: 98 % | DIASTOLIC BLOOD PRESSURE: 68 MMHG | BODY MASS INDEX: 22.76 KG/M2 | SYSTOLIC BLOOD PRESSURE: 116 MMHG | TEMPERATURE: 98.6 F | WEIGHT: 145 LBS

## 2021-11-05 PROCEDURE — 96375 TX/PRO/DX INJ NEW DRUG ADDON: CPT

## 2021-11-05 PROCEDURE — 84484 ASSAY OF TROPONIN QUANT: CPT

## 2021-11-05 PROCEDURE — 36415 COLL VENOUS BLD VENIPUNCTURE: CPT

## 2021-11-05 PROCEDURE — 6360000002 HC RX W HCPCS: Performed by: NURSE PRACTITIONER

## 2021-11-05 RX ADMIN — MORPHINE SULFATE 4 MG: 4 INJECTION, SOLUTION INTRAMUSCULAR; INTRAVENOUS at 00:21

## 2021-11-05 ASSESSMENT — PAIN SCALES - GENERAL: PAINLEVEL_OUTOF10: 10

## 2021-11-05 NOTE — ED PROVIDER NOTES
[facility] EMERGENCY DEPT  eMERGENCY dEPARTMENT eNCOUnter      Pt Name: Hudson Guerrero  MRN: [id number]  Joaniegfhenry 1995  Date of evaluation: 11/4/2021  Provider: [address]       Chief Complaint   Patient presents with    Back Pain     upper back pain between shoulder blades. Pt denies injury. HISTORY OF PRESENT ILLNESS   (Location/Symptom, Timing/Onset,Context/Setting, Quality, Duration, Modifying Factors, Severity)  Note limiting factors. Marisela Massed a 27 y.o. male who presents to the emergency department for evaluation of back pain. Pt tells me that he developed sudden pain of upper back at work tonight. 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 He relates that he has pain that radiates to his right shoulder and neck. He tells me that back pain is worse with deep breathing. He denies direct injury to his back. He has had no abdominal pain. He denies upper extremity numbness/weakness. HPI    Nursing Notes were reviewed. REVIEW OF SYSTEMS    (2-9 systems for level 4, 10 or more for level 5)     Review of Systems   Constitutional: Negative for fever. Gastrointestinal: Negative for abdominal pain. Musculoskeletal: Positive for back pain. Neurological: Negative for weakness and numbness. A complete review of systems was performed and is negative except as noted above in the HPI. PAST MEDICAL HISTORY     Past Medical History:   Diagnosis Date    Anxiety     H/O lithotripsy     Kidney stone     Tooth ache          SURGICAL HISTORY       Past Surgical History:   Procedure Laterality Date    LITHOTRIPSY      LITHOTRIPSY           CURRENT MEDICATIONS       Previous Medications    No medications on file       ALLERGIES     Patient has no known allergies. FAMILY HISTORY     History reviewed. No pertinent family history.        SOCIAL HISTORY       Social History     Socioeconomic History    Marital status: Single     Spouse name: None    Number of children: None    Years of education: None    Highest education level: None   Occupational History    None   Tobacco Use    Smoking status: Current Every Day Smoker     Packs/day: 0.00     Types: E-Cigarettes    Smokeless tobacco: Never Used   Substance and Sexual Activity    Alcohol use: No    Drug use: No     Comment: history meth addiction-sober 2 years    Sexual activity: None   Other Topics Concern    None   Social History Narrative    None     Social Determinants of Health     Financial Resource Strain:     Difficulty of Paying Living Expenses:    Food Insecurity:     Worried About Running Out of Food in the Last Year:     Ran Out of Food in the Last Year:    Transportation Needs:     Lack of Transportation (Medical):  Lack of Transportation (Non-Medical):    Physical Activity:     Days of Exercise per Week:     Minutes of Exercise per Session:    Stress:     Feeling of Stress :    Social Connections:     Frequency of Communication with Friends and Family:     Frequency of Social Gatherings with Friends and Family:     Attends Latter day Services:     Active Member of Clubs or Organizations:     Attends Club or Organization Meetings:     Marital Status:    Intimate Partner Violence:     Fear of Current or Ex-Partner:     Emotionally Abused:     Physically Abused:     Sexually Abused:        SCREENINGS             PHYSICAL EXAM    (up to 7 for level 4, 8 or more for level 5)     ED Triage Vitals   BP Temp Temp src Pulse Resp SpO2 Height Weight   -- -- -- -- -- -- -- --     Vitals:    11/04/21 2237   BP: (!) 148/99   Pulse: 83   Resp: 20   Temp: 98.6 °F (37 °C)   TempSrc: Oral   SpO2: 95%   Weight: 145 lb (65.8 kg)   Height: 5' 7\" (1.702 m)         Physical Exam  Vitals reviewed. HENT:      Head: Normocephalic.       Right Ear: External ear normal.      Left Ear: External ear normal.   Eyes:      Conjunctiva/sclera: Conjunctivae normal.      Pupils: Pupils are equal, round, and reactive to light. Neck:      Comments: No direct ttp cervical spine  Cardiovascular:      Rate and Rhythm: Normal rate and regular rhythm. Heart sounds: Normal heart sounds. Pulmonary:      Effort: Pulmonary effort is normal.      Breath sounds: Normal breath sounds. Abdominal:      General: Bowel sounds are normal.      Palpations: Abdomen is soft. Musculoskeletal:         General: Normal range of motion. Cervical back: Normal range of motion. Comments: ttp upper thoracic spine  No ttp lumbar spine  Normal sensation bilateral deltoids  CMS intact bilateral upper extremities   Skin:     General: Skin is warm and dry. Neurological:      Mental Status: He is alert and oriented to person, place, and time. DIAGNOSTIC RESULTS     EKG: All EKG's are interpreted by the Emergency Department Physician who either signs or Co-signs this chart in the absence of acardiologist.    There is a regular rate and rhythm. SR w/repol pattern Normal CT interval and normal P waves. Normal QRS interval. Normal QT interval. No obvious ST elevation or ST depression. RADIOLOGY:   Non-plain film images such as CT, Ultrasound andMRI are read by the radiologist. Plain radiographic images are visualized and preliminarily interpreted by the emergency physician with the below findings:        Interpretation per the Radiologist below, if available at the time of this note:    [address]    (Results Pending)   XR CHEST PORTABLE    (Results Pending)     CXR reviewed by me=>NAD    ED BEDSIDE ULTRASOUND:   Performed by ED Physician - none    LABS:  Labs Reviewed   TROPONIN       All other labs were within normal range or not returned as of this dictation. RE-ASSESSMENT     CT T SPINE:    Fracture of the T3 spinous process. Degenerative changes.       EMERGENCY DEPARTMENT COURSE and DIFFERENTIALDIAGNOSIS/MDM:   Vitals:    Vitals:    11/04/21 2237   BP: (!) 148/99   Pulse: 83 Resp: 20   Temp: 98.6 °F (37 °C)   TempSrc: Oral   SpO2: 95%   Weight: 145 lb (65.8 kg)   Height: 5' 7\" (1.702 m)       MDM      CONSULTS:  None    PROCEDURES:  Unless otherwise notedbelow, none     Procedures    FINAL IMPRESSION     1. Closed fracture of spinous process of thoracic vertebra, initial encounter [facility])          DISPOSITION/PLAN   DISPOSITION        PATIENT REFERRED TO:  Matilda Briscoe MD  [facility]  [phone]    Schedule an appointment as soon as possible for a visit         DISCHARGE MEDICATIONS:       Current Discharge Medication List           Medication List      START taking these medications    HYDROcodone-acetaminophen  MG per tablet  Commonly known as: Norco  Take 1 tablet by mouth every 6 hours as needed for Pain for up to 3 days.  Intended supply: 3 days     naproxen 500 MG tablet  Commonly known as: Naprosyn  Take 1 tablet by mouth 2 times daily for 10 days           Where to Get Your Medications      These medications were sent to [address] [place] [address], [address] [place] [address] RD., [address] [place] [id number]    Hours: 24-hours Phone: [phone]   · HYDROcodone-acetaminophen  MG per tablet  · naproxen 500 MG tablet           (Pleasenote that portions of this note were completed with a voice recognition program.  Efforts were made to edit the dictations but occasionally words are mis-transcribed.)              Herminio Martin, NO  11/05/21 0002

## 2021-11-06 LAB
EKG P AXIS: 42 DEGREES
EKG P-R INTERVAL: 142 MS
EKG Q-T INTERVAL: 368 MS
EKG QRS DURATION: 82 MS
EKG QTC CALCULATION (BAZETT): 401 MS
EKG T AXIS: 10 DEGREES

## 2021-11-06 PROCEDURE — 93010 ELECTROCARDIOGRAM REPORT: CPT | Performed by: INTERNAL MEDICINE

## 2021-11-07 NOTE — PROGRESS NOTES
Aman Mcmullen ( 1995) is a 27 y.o. male, NEW , here for evaluation of the following chief complaint(s). Back Pain (pain between shoulder pain )        ASSESSMENT/PLAN:  Problem List        Musculoskeletal and Integument    Closed fracture of third thoracic vertebra [facility])      Patient has pain in the T3 area and has problems moving his right arm and completed his pain medication yesterday patient stated that he was not involved in any violence or any trauma patient was referred to neurosurgery thoracic evaluation for possible restraint if needed and also pain management for management of chronic pain         Relevant Medications    Acetaminophen-Caffeine (EXCEDRIN ASPIRIN FREE PO)    naproxen (NAPROSYN) 500 MG tablet    HYDROcodone-acetaminophen (NORCO)  MG per tablet    methocarbamol (ROBAXIN-750) 750 MG tablet        There is a nondisplaced fracture spinous process T3. . Vertebral body   heights and alignment are well maintained. Degenerative changes noted   at T8-9 level. There is loss of height of the disc space and   hypertrophic degenerative endplate changes. . There is no bony   narrowing of the spinal canal or neural foramina. There is no evidence   of facet lock or perch. The lungs is visualized is clear. .    The visualized soft tissues are unremarkable. Results for orders placed or performed during the hospital encounter of 21   Troponin   Result Value Ref Range    Troponin <0.01 0.00 - 0.03 ng/mL   EKG 12 Lead   Result Value Ref Range    P-R Interval 142 ms    QRS Duration 82 ms    Q-T Interval 368 ms    QTc Calculation (Bazett) 401 ms    P Axis 42 degrees    T Axis 10 degrees       Return in about 6 weeks (around 2021).     HPI   76-year-old male presents as a new patient visit  Seen in the emergency room on  for pain in the upper back CT scan showed a nondisplaced spinous process fracture of his T3  Patient states that he was not involved with trauma he was just working and suddenly had pain he has difficulty of moving his right upper extremity although his  strength is good  After the visit the patient stated that he needs a note for restricted duty since he is going to snf        Review of Systems   Constitutional: Negative for activity change, appetite change, chills, diaphoresis, fatigue and fever. HENT: Negative for congestion, hearing loss, postnasal drip, sore throat, tinnitus and trouble swallowing. Eyes: Negative for visual disturbance. Respiratory: Negative for cough, shortness of breath, wheezing and stridor. Cardiovascular: Negative for chest pain, palpitations and leg swelling. Gastrointestinal: Negative for abdominal pain, blood in stool, constipation and diarrhea. Endocrine: Negative for cold intolerance. Genitourinary: Negative for frequency. Musculoskeletal: Positive for back pain. Negative for arthralgias and joint swelling. Skin: Negative for color change and wound. Allergic/Immunologic: Negative for environmental allergies. Neurological: Negative for dizziness, light-headedness and headaches. Hematological: Negative for adenopathy. Does not bruise/bleed easily. Psychiatric/Behavioral: Negative for decreased concentration, dysphoric mood and sleep disturbance. The patient is not nervous/anxious. Physical Exam  Musculoskeletal:         General: Tenderness present. No deformity. Right shoulder: Normal.      Left shoulder: Normal.      Right upper arm: No swelling, deformity, lacerations or tenderness. Left upper arm: Normal.      Cervical back: Normal.      Thoracic back: Tenderness and bony tenderness present. No swelling, deformity, lacerations or spasms. Lumbar back: Normal.   Neurological:      Mental Status: He is alert and oriented to person, place, and time. Mental status is at baseline. Sensory: Sensation is intact. Motor: Motor function is intact. Coordination: Coordination is intact. Gait: Gait is intact.            (Time Documentation Optional [id number])    An electronic signaturewaas used to authenticate this note  -Allison Rodriguez MD on 11/8/2021 at 6:06 PM

## 2021-11-08 ENCOUNTER — OFFICE VISIT (OUTPATIENT)
Dept: INTERNAL MEDICINE | Age: 26
End: 2021-11-08
Payer: COMMERCIAL

## 2021-11-08 ENCOUNTER — TELEPHONE (OUTPATIENT)
Dept: INTERNAL MEDICINE | Age: 26
End: 2021-11-08

## 2021-11-08 VITALS
DIASTOLIC BLOOD PRESSURE: 68 MMHG | OXYGEN SATURATION: 99 % | HEIGHT: 67 IN | WEIGHT: 144 LBS | SYSTOLIC BLOOD PRESSURE: 110 MMHG | HEART RATE: 87 BPM | BODY MASS INDEX: 22.6 KG/M2

## 2021-11-08 DIAGNOSIS — S22.039S: Primary | ICD-10-CM

## 2021-11-08 DIAGNOSIS — Z11.4 SCREENING FOR HIV WITHOUT PRESENCE OF RISK FACTORS: ICD-10-CM

## 2021-11-08 DIAGNOSIS — Z11.59 ENCOUNTER FOR HEPATITIS C SCREENING TEST FOR LOW RISK PATIENT: ICD-10-CM

## 2021-11-08 DIAGNOSIS — S22.008A CLOSED FRACTURE OF SPINOUS PROCESS OF THORACIC VERTEBRA, INITIAL ENCOUNTER (HCC): ICD-10-CM

## 2021-11-08 DIAGNOSIS — S22.030S CLOSED WEDGE COMPRESSION FRACTURE OF T3 VERTEBRA, SEQUELA: ICD-10-CM

## 2021-11-08 PROBLEM — S22.039A CLOSED FRACTURE OF THIRD THORACIC VERTEBRA (HCC): Status: ACTIVE | Noted: 2021-11-08

## 2021-11-08 LAB
HEPATITIS C ANTIBODY INTERPRETATION: NORMAL
HIV-1 P24 AG: NORMAL
RAPID HIV 1&2: NORMAL

## 2021-11-08 PROCEDURE — 99203 OFFICE O/P NEW LOW 30 MIN: CPT | Performed by: INTERNAL MEDICINE

## 2021-11-08 RX ORDER — HYDROCODONE BITARTRATE AND ACETAMINOPHEN 10; 325 MG/1; MG/1
1 TABLET ORAL EVERY 6 HOURS PRN
Qty: 12 TABLET | Refills: 0 | Status: SHIPPED | OUTPATIENT
Start: 2021-11-08 | End: 2021-11-11

## 2021-11-08 RX ORDER — NAPROXEN 500 MG/1
500 TABLET ORAL 2 TIMES DAILY
Qty: 60 TABLET | Refills: 1 | Status: SHIPPED | OUTPATIENT
Start: 2021-11-08 | End: 2021-12-08

## 2021-11-08 RX ORDER — METHOCARBAMOL 750 MG/1
750 TABLET, FILM COATED ORAL 4 TIMES DAILY
Qty: 120 TABLET | Refills: 1 | Status: SHIPPED | OUTPATIENT
Start: 2021-11-08 | End: 2021-12-08

## 2021-11-08 SDOH — ECONOMIC STABILITY: FOOD INSECURITY: WITHIN THE PAST 12 MONTHS, THE FOOD YOU BOUGHT JUST DIDN'T LAST AND YOU DIDN'T HAVE MONEY TO GET MORE.: NEVER TRUE

## 2021-11-08 SDOH — ECONOMIC STABILITY: FOOD INSECURITY: WITHIN THE PAST 12 MONTHS, YOU WORRIED THAT YOUR FOOD WOULD RUN OUT BEFORE YOU GOT MONEY TO BUY MORE.: NEVER TRUE

## 2021-11-08 ASSESSMENT — PATIENT HEALTH QUESTIONNAIRE - PHQ9
2. FEELING DOWN, DEPRESSED OR HOPELESS: 1
10. IF YOU CHECKED OFF ANY PROBLEMS, HOW DIFFICULT HAVE THESE PROBLEMS MADE IT FOR YOU TO DO YOUR WORK, TAKE CARE OF THINGS AT HOME, OR GET ALONG WITH OTHER PEOPLE: 3
SUM OF ALL RESPONSES TO PHQ QUESTIONS 1-9: 4
3. TROUBLE FALLING OR STAYING ASLEEP: 1
8. MOVING OR SPEAKING SO SLOWLY THAT OTHER PEOPLE COULD HAVE NOTICED. OR THE OPPOSITE, BEING SO FIGETY OR RESTLESS THAT YOU HAVE BEEN MOVING AROUND A LOT MORE THAN USUAL: 0
7. TROUBLE CONCENTRATING ON THINGS, SUCH AS READING THE NEWSPAPER OR WATCHING TELEVISION: 1
5. POOR APPETITE OR OVEREATING: 0
6. FEELING BAD ABOUT YOURSELF - OR THAT YOU ARE A FAILURE OR HAVE LET YOURSELF OR YOUR FAMILY DOWN: 0
9. THOUGHTS THAT YOU WOULD BE BETTER OFF DEAD, OR OF HURTING YOURSELF: 0
SUM OF ALL RESPONSES TO PHQ QUESTIONS 1-9: 4
SUM OF ALL RESPONSES TO PHQ9 QUESTIONS 1 & 2: 2
SUM OF ALL RESPONSES TO PHQ QUESTIONS 1-9: 4
1. LITTLE INTEREST OR PLEASURE IN DOING THINGS: 1

## 2021-11-08 ASSESSMENT — ENCOUNTER SYMPTOMS
COLOR CHANGE: 0
ABDOMINAL PAIN: 0
BACK PAIN: 1
DIARRHEA: 0
BLOOD IN STOOL: 0
STRIDOR: 0
CONSTIPATION: 0
WHEEZING: 0
TROUBLE SWALLOWING: 0
COUGH: 0
SHORTNESS OF BREATH: 0
SORE THROAT: 0

## 2021-11-08 ASSESSMENT — SOCIAL DETERMINANTS OF HEALTH (SDOH): HOW HARD IS IT FOR YOU TO PAY FOR THE VERY BASICS LIKE FOOD, HOUSING, MEDICAL CARE, AND HEATING?: NOT HARD AT ALL

## 2021-11-08 NOTE — TELEPHONE ENCOUNTER
Pt came back to the office and is wanting a letter written for him that he needs restrictions he is going to have to go serve 21 days in shelter and said he is having a lot of pain and needs a letter for shelter.

## 2021-11-08 NOTE — TELEPHONE ENCOUNTER
He has to see Neurosurgery, and pain management, he has to be specific about what he wants the restrictions to be and why did he not tell me during the visit

## 2021-11-09 PROCEDURE — 90674 CCIIV4 VAC NO PRSV 0.5 ML IM: CPT | Performed by: INTERNAL MEDICINE

## 2021-11-09 PROCEDURE — 90471 IMMUNIZATION ADMIN: CPT | Performed by: INTERNAL MEDICINE

## 2021-11-09 NOTE — ASSESSMENT & PLAN NOTE
Patient has pain in the T3 area and has problems moving his right arm and completed his pain medication yesterday patient stated that he was not involved in any violence or any trauma patient was referred to neurosurgery thoracic evaluation for possible restraint if needed and also pain management for management of chronic pain

## 2021-11-09 NOTE — TELEPHONE ENCOUNTER
Pt came by the office and unassisted walked to the door when I called his name and said that the letter would just need to say that he is incapable of doing anything for himself that he cant dress himself or shower by himself or do anything. I let him know I would have to let Dr Lazarus Moats know pt Vu and walked unassisted out of the office.

## 2021-11-09 NOTE — TELEPHONE ENCOUNTER
He has to be evaluated by neurosurgery, and he did not tell me why he was going to long term, don't feel comfortable about writing the letter

## 2021-11-09 NOTE — TELEPHONE ENCOUNTER
Left message for pt to call us back with exactly what he needs and that he will have to see pain management and neurosurgery.

## 2021-11-12 ENCOUNTER — HOSPITAL ENCOUNTER (EMERGENCY)
Age: 26
Discharge: HOME | End: 2021-11-12
Attending: PEDIATRICS

## 2021-11-12 ENCOUNTER — APPOINTMENT (OUTPATIENT)
Dept: MRI IMAGING | Age: 26
End: 2021-11-12

## 2021-11-12 ENCOUNTER — APPOINTMENT (OUTPATIENT)
Dept: GENERAL RADIOLOGY | Age: 26
End: 2021-11-12

## 2021-11-12 VITALS
HEART RATE: 61 BPM | RESPIRATION RATE: 16 BRPM | TEMPERATURE: 98.1 F | BODY MASS INDEX: 22.6 KG/M2 | WEIGHT: 144 LBS | HEIGHT: 67 IN | DIASTOLIC BLOOD PRESSURE: 93 MMHG | OXYGEN SATURATION: 99 % | SYSTOLIC BLOOD PRESSURE: 127 MMHG

## 2021-11-12 DIAGNOSIS — R20.2 ARM PARESTHESIA, RIGHT: ICD-10-CM

## 2021-11-12 DIAGNOSIS — M62.838 SPASM OF MUSCLE: Primary | ICD-10-CM

## 2021-11-12 DIAGNOSIS — S39.012A BACK STRAIN, INITIAL ENCOUNTER: ICD-10-CM

## 2021-11-12 PROCEDURE — 96376 TX/PRO/DX INJ SAME DRUG ADON: CPT

## 2021-11-12 PROCEDURE — 72146 MRI CHEST SPINE W/O DYE: CPT

## 2021-11-12 PROCEDURE — 71046 X-RAY EXAM CHEST 2 VIEWS: CPT

## 2021-11-12 PROCEDURE — 96374 THER/PROPH/DIAG INJ IV PUSH: CPT

## 2021-11-12 PROCEDURE — 6360000002 HC RX W HCPCS: Performed by: EMERGENCY MEDICINE

## 2021-11-12 PROCEDURE — 96372 THER/PROPH/DIAG INJ SC/IM: CPT

## 2021-11-12 PROCEDURE — 99283 EMERGENCY DEPT VISIT LOW MDM: CPT

## 2021-11-12 PROCEDURE — 6360000002 HC RX W HCPCS: Performed by: PEDIATRICS

## 2021-11-12 PROCEDURE — 96375 TX/PRO/DX INJ NEW DRUG ADDON: CPT

## 2021-11-12 PROCEDURE — 72141 MRI NECK SPINE W/O DYE: CPT

## 2021-11-12 RX ORDER — HYDROMORPHONE HYDROCHLORIDE 1 MG/ML
0.5 INJECTION, SOLUTION INTRAMUSCULAR; INTRAVENOUS; SUBCUTANEOUS EVERY 30 MIN PRN
Status: DISCONTINUED | OUTPATIENT
Start: 2021-11-12 | End: 2021-11-12 | Stop reason: HOSPADM

## 2021-11-12 RX ORDER — ORPHENADRINE CITRATE 30 MG/ML
60 INJECTION INTRAMUSCULAR; INTRAVENOUS ONCE
Status: COMPLETED | OUTPATIENT
Start: 2021-11-12 | End: 2021-11-12

## 2021-11-12 RX ORDER — LORAZEPAM 2 MG/ML
1 INJECTION INTRAMUSCULAR ONCE
Status: COMPLETED | OUTPATIENT
Start: 2021-11-12 | End: 2021-11-12

## 2021-11-12 RX ADMIN — HYDROMORPHONE HYDROCHLORIDE 0.5 MG: 1 INJECTION, SOLUTION INTRAMUSCULAR; INTRAVENOUS; SUBCUTANEOUS at 05:42

## 2021-11-12 RX ADMIN — LORAZEPAM 1 MG: 2 INJECTION, SOLUTION INTRAMUSCULAR; INTRAVENOUS at 07:20

## 2021-11-12 RX ADMIN — ORPHENADRINE CITRATE 60 MG: 30 INJECTION INTRAMUSCULAR; INTRAVENOUS at 04:04

## 2021-11-12 RX ADMIN — HYDROMORPHONE HYDROCHLORIDE 0.5 MG: 1 INJECTION, SOLUTION INTRAMUSCULAR; INTRAVENOUS; SUBCUTANEOUS at 04:05

## 2021-11-12 ASSESSMENT — ENCOUNTER SYMPTOMS
SHORTNESS OF BREATH: 0
COLOR CHANGE: 0
NAUSEA: 0
ABDOMINAL PAIN: 0
EYE DISCHARGE: 0
BACK PAIN: 1
RHINORRHEA: 0
COUGH: 0
VOMITING: 0

## 2021-11-12 ASSESSMENT — PAIN SCALES - GENERAL
PAINLEVEL_OUTOF10: 5
PAINLEVEL_OUTOF10: 7
PAINLEVEL_OUTOF10: 5
PAINLEVEL_OUTOF10: 10

## 2021-11-12 ASSESSMENT — PAIN DESCRIPTION - ORIENTATION: ORIENTATION: MID;UPPER

## 2021-11-12 ASSESSMENT — PAIN DESCRIPTION - DESCRIPTORS: DESCRIPTORS: CONSTANT

## 2021-11-12 ASSESSMENT — PAIN DESCRIPTION - LOCATION: LOCATION: BACK

## 2021-11-12 NOTE — Clinical Note
Lyndsey Stringer was seen and treated in our emergency department on 11/12/2021. He may return to work on 11/22/2021. If you have any questions or concerns, please don't hesitate to call.       Nereida Ferraro MD

## 2021-11-12 NOTE — Clinical Note
Nelda Feliz was seen and treated in our emergency department on 11/12/2021. He may return to work on 11/22/2021. If you have any questions or concerns, please don't hesitate to call.       Sara Morin., MD

## 2021-11-12 NOTE — ED PROVIDER NOTES
[facility]  eMERGENCY dEPARTMENT eNCOUnter      Pt Name: Darlin Nowak  MRN: [id number]  [place] 1995  Date of evaluation: 11/12/2021  Provider: Bari Díaz MD    CHIEF COMPLAINT       Chief Complaint   Patient presents with    Back Pain     diagnosed thoracic spinal fracture last thursday. had MRI today. pain worse. HISTORY OF PRESENT ILLNESS   (Location/Symptom, Timing/Onset,Context/Setting, Quality, Duration, Modifying Factors, Severity)  Note limiting factors. Darlin Nowak is a 27 y.o. male who presents to the emergency department with back pain. Patient states that \"Thursday I woke with the pain. \"  Patient states \"I had soreness between my shoulder blades. \"  The soreness was causing severe pain \"and I was in tears. \"  Patient was evaluated here in the emergency department and diagnosed with a transverse process fracture of T3. Patient states that pain improved and then \"became worse again tonight while he was at work on light duty. 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 \"  Patient states that he has performed this job for years. Patient states the pain has become more diffuse. Patient is also having pain and tingling in left shoulder. Patient states that his right upper arm \"is numb. \"  Patient states that bending or moving his arms as an dressing or showering makes the pain worse. Patient states that laying down helps pain initially but then it worsens as well. Patient gives the level of pain 5 out of 10. Pain seems to be aching at this time. Patient denies weakness or inability to move or lift arms. Patient denies difficulty with controlling bowel or bladder. Patient do those numbness below his elbow. Patient followed up with Dr. Samir Hopkins who ordered an MRI of the thoracic spine yesterday.   Patient states radiology requested that he come back today at 2:30 PM but did not advise him of what the visit was about. HPI    NursingNotes were reviewed. REVIEW OF SYSTEMS    (2-9 systems for level 4, 10 or more for level 5)     Review of Systems   Constitutional: Negative for chills and fever. HENT: Negative for congestion and rhinorrhea. Eyes: Negative for discharge. Respiratory: Negative for cough and shortness of breath. Cardiovascular: Negative for chest pain and palpitations. Gastrointestinal: Negative for abdominal pain, nausea and vomiting. Genitourinary: Negative for difficulty urinating and dysuria. Musculoskeletal: Positive for back pain. Negative for neck pain. Skin: Negative for color change and pallor. Neurological: Positive for numbness (Right upper arm and overlying clavicles. ). Negative for syncope, weakness and light-headedness. Psychiatric/Behavioral: Negative for agitation and confusion. All other systems reviewed and are negative. PAST MEDICALHISTORY     Past Medical History:   Diagnosis Date    Anxiety     Depression     just recently with the back pain    H/O lithotripsy     Kidney stone     Tooth ache          SURGICAL HISTORY       Past Surgical History:   Procedure Laterality Date    LITHOTRIPSY      LITHOTRIPSY           CURRENT MEDICATIONS     Previous Medications    METHOCARBAMOL (ROBAXIN-750) 750 MG TABLET    Take 1 tablet by mouth 4 times daily    NAPROXEN (NAPROSYN) 500 MG TABLET    Take 1 tablet by mouth 2 times daily       ALLERGIES     Patient has no known allergies.     FAMILY HISTORY       Family History   Problem Relation Age of Onset    No Known Problems Mother     Heart Disease Father           SOCIAL HISTORY       Social History     Socioeconomic History    Marital status: Single     Spouse name: None    Number of children: None    Years of education: None    Highest education level: None   Occupational History    None   Tobacco Use    Smoking status: Current Every Day Smoker     Packs/day: 0.00     Years: 5.00     Pack years: 0.00     Types: E-Cigarettes    Smokeless tobacco: Never Used   Vaping Use    Vaping Use: Every day    Substances: Nicotine    Devices: Disposable, 2 pods a week   Substance and Sexual Activity    Alcohol use: None    Drug use: No     Comment: history meth addiction-sober 2 years    Sexual activity: None   Other Topics Concern    None   Social History Narrative    None     Social Determinants of Health     Financial Resource Strain: Low Risk     Difficulty of Paying Living Expenses: Not hard at all   Food Insecurity: No Food Insecurity    Worried About Running Out of Food in the Last Year: Never true    Eliezer of Food in the Last Year: Never true   Transportation Needs:     Lack of Transportation (Medical): Not on file    Lack of Transportation (Non-Medical):  Not on file   Physical Activity:     Days of Exercise per Week: Not on file    Minutes of Exercise per Session: Not on file   Stress:     Feeling of Stress : Not on file   Social Connections:     Frequency of Communication with Friends and Family: Not on file    Frequency of Social Gatherings with Friends and Family: Not on file    Attends Sikh Services: Not on file    Active Member of [address] [place] [id number] or Organizations: Not on file    Attends Club or Organization Meetings: Not on file    Marital Status: Not on file   Intimate Partner Violence:     Fear of Current or Ex-Partner: Not on file    Emotionally Abused: Not on file    Physically Abused: Not on file    Sexually Abused: Not on file   Housing Stability:     Unable to Pay for Housing in the Last Year: Not on file    Number of [place] in the Last Year: Not on file    Unstable Housing in the Last Year: Not on file       SCREENINGS    [place] Coma Scale  Eye Opening: Spontaneous  Best Verbal Response: Oriented  Best Motor Response: Obeys commands  Génesis Coma Scale Score: 15        PHYSICAL EXAM    (up to 7 for level 4, 8 or more for level 5)     ED Triage Vitals   BP Temp Temp src Pulse Resp SpO2 Height Weight   11/12/21 0216 11/12/21 0216 -- 11/12/21 0216 11/12/21 0216 11/12/21 0216 11/12/21 0213 11/12/21 0213   (!) 133/105 98.1 °F (36.7 °C)  78 20 97 % 5' 7\" (1.702 m) 144 lb (65.3 kg)       Physical Exam  Vitals and nursing note reviewed. Constitutional:       General: He is not in acute distress. Appearance: Normal appearance. HENT:      Head: Normocephalic and atraumatic. Right Ear: External ear normal.      Left Ear: External ear normal.      Nose: Nose normal.      Mouth/Throat:      Mouth: Mucous membranes are moist.      Pharynx: Oropharynx is clear. No oropharyngeal exudate. Eyes:      General: No scleral icterus. Conjunctiva/sclera: Conjunctivae normal.      Pupils: Pupils are equal, round, and reactive to light. Cardiovascular:      Rate and Rhythm: Normal rate and regular rhythm. Pulses: Normal pulses. Heart sounds: Normal heart sounds. Pulmonary:      Effort: Pulmonary effort is normal.      Breath sounds: Normal breath sounds. Abdominal:      General: Bowel sounds are normal.      Palpations: Abdomen is soft. Tenderness: There is no abdominal tenderness. There is no right CVA tenderness, left CVA tenderness or guarding. Musculoskeletal:         General: Tenderness (Tenderness and significant muscle spasm to palpation of diffuse muscles of back both upper and lower.) present. No deformity. Cervical back: Neck supple. No rigidity. Comments: Patient has difficulty moving her arms backwards or over his head secondary to pain.  strength is 5 out of 5 bilaterally as are push pulls bilaterally. Lower extremity strength is also intact at 5 out of 5. Patient has normal gait. Sensation to light touch is intact bilateral hands. Skin:     General: Skin is warm and dry. Capillary Refill: Capillary refill takes less than 2 seconds. Coloration: Skin is not jaundiced. Findings: No erythema. Neurological:      General: No focal deficit present. Mental Status: He is alert and oriented to person, place, and time. Mental status is at baseline. Sensory: No sensory deficit (Diminished sensation to light touch over right bicep.). Motor: No weakness. Coordination: Coordination normal.   Psychiatric:         Mood and Affect: Mood normal.         Behavior: Behavior normal.         DIAGNOSTIC RESULTS         RADIOLOGY:  Non-plain film images such as CT, Ultrasound and MRI are read by the radiologist. Plain radiographic images are visualized and preliminarily interpreted bythe emergency physician with the below findings:          MRI THORACIC SPINE WO CONTRAST   Final Result   Chronic degenerative changes of the mid and lower thoracic   spine. Moderate disc bulging and prominent osteophytes at T6-7, T7-8 and   T11-12 with moderate compression on the thecal sac. This is more   pronounced at T11-12. No significant spinal stenosis. There is left   neural foraminal stenosis at T11-12. Signed by Dr Ed Peterson    (Results Pending)   XR CHEST (2 VW)    (Results Pending)           LABS:  Labs Reviewed - No data to display    All other labs were within normal range or not returned as of this dictation. EMERGENCY DEPARTMENT COURSE and DIFFERENTIAL DIAGNOSIS/MDM:   Vitals:    Vitals:    11/12/21 0213 11/12/21 0216 11/12/21 0236 11/12/21 0538   BP:  (!) 133/105 104/73 (!) 127/93   Pulse:  78  61   Resp:  20  16   Temp:  98.1 °F (36.7 °C)     SpO2:  97%  99%   Weight: 144 lb (65.3 kg)      Height: 5' 7\" (1.702 m)          MDM  57-year-old male presents with back pain and numbness to right upper arm. During the night hours we were unable to access the recently obtained MRI of the thoracic spine. Discussed with Dr. Dixon Ledesma, neurosurgeon, who request a MRI of the cervical spine as patient's new symptoms correspond to possible cervical etiology.   We will also obtain chest x-ray PA and laterally to observe for possible mass near the brachial plexus. Patient will follow up with Dr. Earl Lee and Dr. Kashif Torres, PCP. Patient will return with increasing or severe pain, weakness, increasing numbness, difficulty with bowel or bladder, or other concerns. Signed out to Dr. Madeline Almazan due to end of shift. Awaiting results of MRI of cervical spine and chest x-ray. CONSULTS:  None    PROCEDURES:  Unless otherwise noted below, none     Procedures    FINAL IMPRESSION      1. Spasm of muscle    2. Back strain, initial encounter    3.  Arm paresthesia, right          DISPOSITION/PLAN   DISPOSITION Discharge - Pending Orders Complete 11/12/2021 06:53:43 AM      PATIENT REFERRED TO:  Aimee Awan MD  [address] [address]  [phone]    Schedule an appointment as soon as possible for a visit       Jeremiah Ford MD  [address] [place] [address] [phone]    Schedule an appointment as soon as possible for a visit         DISCHARGE MEDICATIONS:  New Prescriptions    No medications on file          (Please note that portions of this note were completed with a voice recognition program.  Efforts were made to edit thedictations but occasionally words are mis-transcribed.)    Karmen Peterson MD (electronically signed)  Attending Emergency Physician          Karmen Peterson MD  11/12/21 4700

## 2021-11-12 NOTE — Clinical Note
Dee Campos was seen and treated in our emergency department on 11/12/2021. He may return to work on 11/15/2021. If you have any questions or concerns, please don't hesitate to call.       Patricia Graves MD

## 2021-11-16 ENCOUNTER — TELEPHONE (OUTPATIENT)
Dept: NEUROSURGERY | Age: 26
End: 2021-11-16

## 2021-11-16 NOTE — TELEPHONE ENCOUNTER
I called patient but no answer. I left a vm asking for a return call. I have a referral for this patient. Call x 1.

## 2021-11-18 ENCOUNTER — TELEPHONE (OUTPATIENT)
Dept: NEUROSURGERY | Age: 26
End: 2021-11-18

## 2021-11-18 NOTE — TELEPHONE ENCOUNTER
I called patient but no answer. I left a vm asking for a return call. I have a referral for this patient. Call x 2.

## 2021-11-19 ENCOUNTER — TELEPHONE (OUTPATIENT)
Dept: NEUROSURGERY | Age: 26
End: 2021-11-19

## 2021-11-19 NOTE — TELEPHONE ENCOUNTER
3rd attempt made to call patient to schedule appointment left voicemail with call back number [phone]

## 2022-03-01 ENCOUNTER — HOSPITAL ENCOUNTER (EMERGENCY)
Age: 27
Discharge: HOME | End: 2022-03-01
Attending: EMERGENCY MEDICINE
Payer: COMMERCIAL

## 2022-03-01 VITALS
RESPIRATION RATE: 17 BRPM | BODY MASS INDEX: 23.18 KG/M2 | DIASTOLIC BLOOD PRESSURE: 84 MMHG | HEART RATE: 98 BPM | SYSTOLIC BLOOD PRESSURE: 150 MMHG | TEMPERATURE: 97.7 F | WEIGHT: 148 LBS | OXYGEN SATURATION: 99 %

## 2022-03-01 DIAGNOSIS — S61.012A LACERATION OF LEFT THUMB WITHOUT FOREIGN BODY WITHOUT DAMAGE TO NAIL, INITIAL ENCOUNTER: Primary | ICD-10-CM

## 2022-03-01 PROCEDURE — 99282 EMERGENCY DEPT VISIT SF MDM: CPT

## 2022-03-01 PROCEDURE — 12001 RPR S/N/AX/GEN/TRNK 2.5CM/<: CPT

## 2022-03-01 RX ORDER — CEPHALEXIN 500 MG/1
500 CAPSULE ORAL 3 TIMES DAILY
Qty: 9 CAPSULE | Refills: 0 | Status: SHIPPED | OUTPATIENT
Start: 2022-03-01 | End: 2022-03-04

## 2022-03-01 RX ORDER — HYDROXYZINE HYDROCHLORIDE 25 MG/1
TABLET, FILM COATED ORAL
COMMUNITY

## 2022-03-01 RX ORDER — VENLAFAXINE HYDROCHLORIDE 75 MG/1
CAPSULE, EXTENDED RELEASE ORAL
COMMUNITY

## 2022-03-01 NOTE — ED PROVIDER NOTES
[facility]  eMERGENCY dEPARTMENT eNCOUnter      Pt Name: Altagracia Schwartz  MRN: [id number]  [place] 1995  Date of evaluation: 3/1/2022  Provider: Abner Church MD    [address] [place] [address]       Chief Complaint   Patient presents with    Laceration     to left thumb with pocket knife         HISTORY OF PRESENT ILLNESS   (Location/Symptom, Timing/Onset,Context/Setting, Quality, Duration, Modifying Factors, Severity)  Note limiting factors. Altagracia Schwartz is a 27 y.o. male who presents to the emergency department for evaluation regarding injury to his left thumb. Patient states that he sustained this injury when he was closing a pocket knife. This injury is located over the dorsal aspect of the left thumb. He denies any numbness or tingling. States he is up-to-date on his tetanus. Bleeding is fairly well controlled at this time. He describes the pain as sharp in nature and without relieving factors. NursingNotes were reviewed. REVIEW OF SYSTEMS    (2-9 systems for level 4, 10 or more for level 5)     Review of Systems         PAST MEDICALHISTORY     Past Medical History:   Diagnosis Date    Anxiety     Depression     just recently with the back pain    H/O lithotripsy     Kidney stone     Tooth ache          SURGICAL HISTORY       Past Surgical History:   Procedure Laterality Date    LITHOTRIPSY      LITHOTRIPSY           CURRENT MEDICATIONS     Discharge Medication List as of 3/1/2022  1:18 AM      CONTINUE these medications which have NOT CHANGED    Details   venlafaxine (EFFEXOR XR) 75 MG extended release capsule Effexor XR 75 mg capsule,extended release   Take 1 capsule every day by oral route for 30 days. Historical Med      hydrOXYzine (ATARAX) 25 MG tablet hydroxyzine HCl 25 mg tablet   Take 1 tablet 3 times a day by oral route. Historical Med      naproxen (NAPROSYN) 500 MG tablet Take 1 tablet by mouth 2 times daily, Disp-60 tablet, R-1Normal             ALLERGIES     Patient has no known allergies. FAMILY HISTORY       Family History   Problem Relation Age of Onset    No Known Problems Mother     Heart Disease Father           SOCIAL HISTORY       Social History     Socioeconomic History    Marital status: Single     Spouse name: None    Number of children: None    Years of education: None    Highest education level: None   Occupational History    None   Tobacco Use    Smoking status: Current Every Day Smoker     Packs/day: 0.00     Years: 5.00     Pack years: 0.00     Types: E-Cigarettes    Smokeless tobacco: Never Used   Vaping Use    Vaping Use: Every day    Substances: Nicotine    Devices: Disposable, 2 pods a week   Substance and Sexual Activity    Alcohol use: None    Drug use: No     Comment: history meth addiction-sober 2 years    Sexual activity: None   Other Topics Concern    None   Social History Narrative    None     Social Determinants of Health     Financial Resource Strain: Low Risk     Difficulty of Paying Living Expenses: Not hard at all   Food Insecurity: No Food Insecurity    Worried About Running Out of Food in the Last Year: Never true    Eliezer of Food in the Last Year: Never true   Transportation Needs:     Lack of Transportation (Medical): Not on file    Lack of Transportation (Non-Medical):  Not on file   Physical Activity:     Days of Exercise per Week: Not on file    Minutes of Exercise per Session: Not on file   Stress:     Feeling of Stress : Not on file   Social Connections:     Frequency of Communication with Friends and Family: Not on file    Frequency of Social Gatherings with Friends and Family: Not on file    Attends Mosque Services: Not on file    Active Member of Clubs or Organizations: Not on file    Attends Club or Organization Meetings: Not on file    Marital Status: Not on file   Intimate Partner Violence:     Fear of Current or Ex-Partner: Not on file    Emotionally Abused: Not on file    Physically Abused: Not on file  Sexually Abused: Not on file   Housing Stability:     Unable to Pay for Housing in the Last Year: Not on file    Number of Places Lived in the Last Year: Not on file    Unstable Housing in the Last Year: Not on file       SCREENINGS    Génesis Coma Scale  Eye Opening: Spontaneous  Best Verbal Response: Oriented  Best Motor Response: Obeys commands  [place] Coma Scale Score: 15        PHYSICAL EXAM    (up to 7 for level 4, 8 or more for level 5)     ED Triage Vitals [03/01/22 0038]   BP Temp Temp Source Pulse Resp SpO2 Height Weight   (!) 150/84 97.7 °F (36.5 °C) Temporal 98 17 99 % -- 148 lb (67.1 kg)       Physical Exam  HENT:      Head: Atraumatic. Pulmonary:      Effort: No respiratory distress. Musculoskeletal:        Hands:       Comments: 1.3 cm laceration, patient is able to extend the digit at the IP joint. Sensation is intact. Capillary refill less than 2 seconds. Neurological:      General: No focal deficit present. Mental Status: He is alert and oriented to person, place, and time. DIAGNOSTIC RESULTS         LABS:  Labs Reviewed - No data to display    All other labs were within normal range or not returned as of this dictation.     EMERGENCY DEPARTMENT COURSE and DIFFERENTIAL DIAGNOSIS/MDM:   Vitals:    Vitals:    03/01/22 0038   BP: (!) 150/84   Pulse: 98   Resp: 17   Temp: 97.7 °F (36.5 °C)   TempSrc: Temporal   SpO2: 99%   Weight: 148 lb (67.1 kg)       MDM    PROCEDURES:  Unless otherwise noted below, none     Lac Repair    Date/Time: 3/1/2022 1:15 AM  Performed by: Min Cook MD  Authorized by: Min Cook MD     Consent:     Consent obtained:  Verbal    Consent given by:  Patient    Risks discussed:  Poor wound healing, poor cosmetic result and infection    Alternatives discussed:  No treatment  Laceration details:     Location:  Finger    Finger location:  L thumb    Length (cm):  1.3  Repair type:     Repair type:  Simple  Pre-procedure details:     Preparation: Patient was prepped and draped in usual sterile fashion  Treatment:     Area cleansed with:  Saline    Amount of cleaning:  Standard    Irrigation solution:  Sterile saline    Irrigation method:  Syringe  Skin repair:     Repair method:  Sutures    Suture size:  4-0    Suture material:  Nylon    Suture technique:  Simple interrupted  Approximation:     Approximation:  Close  Post-procedure details:     Dressing:  Antibiotic ointment, non-adherent dressing and bulky dressing    Patient tolerance of procedure: Tolerated well, no immediate complications        FINAL IMPRESSION      1.  Laceration of left thumb without foreign body without damage to nail, initial encounter          DISPOSITION/PLAN   DISPOSITION Decision To Discharge 03/01/2022 01:17:47 AM      PATIENT REFERRED TO:  [facility] EMERGENCY DEPT  [facility] [place]  [phone]  In 10 days  For suture removal      DISCHARGE MEDICATIONS:  Discharge Medication List as of 3/1/2022  1:18 AM      START taking these medications    Details   cephALEXin (KEFLEX) 500 MG capsule Take 1 capsule by mouth 3 times daily for 3 days, Disp-9 capsule, R-0Normal                (Please note that portions of this note were completed with a voice recognition program.  Efforts were made to edit thedictations but occasionally words are mis-transcribed.)    Fredi Holt MD (electronically signed)  Attending Emergency Physician         Fredi Holt MD  03/21/22 1037

## 2022-11-23 ENCOUNTER — OFFICE VISIT (OUTPATIENT)
Dept: INTERNAL MEDICINE | Facility: CLINIC | Age: 27
End: 2022-11-23

## 2022-11-23 VITALS
SYSTOLIC BLOOD PRESSURE: 118 MMHG | HEART RATE: 90 BPM | TEMPERATURE: 97.3 F | OXYGEN SATURATION: 99 % | DIASTOLIC BLOOD PRESSURE: 78 MMHG | BODY MASS INDEX: 21.79 KG/M2 | WEIGHT: 143.8 LBS | HEIGHT: 68 IN

## 2022-11-23 DIAGNOSIS — Z72.0 VAPES NICOTINE CONTAINING SUBSTANCE: ICD-10-CM

## 2022-11-23 DIAGNOSIS — Z11.59 NEED FOR HEPATITIS C SCREENING TEST: ICD-10-CM

## 2022-11-23 DIAGNOSIS — G25.81 RESTLESS LEG SYNDROME: ICD-10-CM

## 2022-11-23 DIAGNOSIS — Z00.00 ANNUAL PHYSICAL EXAM: Primary | ICD-10-CM

## 2022-11-23 PROCEDURE — 99214 OFFICE O/P EST MOD 30 MIN: CPT | Performed by: NURSE PRACTITIONER

## 2022-11-23 RX ORDER — ROPINIROLE 0.25 MG/1
0.25 TABLET, FILM COATED ORAL NIGHTLY
Qty: 60 TABLET | Refills: 0 | Status: SHIPPED | OUTPATIENT
Start: 2022-11-23 | End: 2022-12-05

## 2022-11-24 LAB
ALBUMIN SERPL-MCNC: 5 G/DL (ref 3.5–5.2)
ALBUMIN/GLOB SERPL: 2.5 G/DL
ALP SERPL-CCNC: 82 U/L (ref 39–117)
ALT SERPL-CCNC: 14 U/L (ref 1–41)
APPEARANCE UR: ABNORMAL
AST SERPL-CCNC: 29 U/L (ref 1–40)
BACTERIA #/AREA URNS HPF: ABNORMAL /HPF
BASOPHILS # BLD AUTO: 0.02 10*3/MM3 (ref 0–0.2)
BASOPHILS NFR BLD AUTO: 0.4 % (ref 0–1.5)
BILIRUB SERPL-MCNC: 0.2 MG/DL (ref 0–1.2)
BILIRUB UR QL STRIP: NEGATIVE
BUN SERPL-MCNC: 15 MG/DL (ref 6–20)
BUN/CREAT SERPL: 14.3 (ref 7–25)
CALCIUM SERPL-MCNC: 9.7 MG/DL (ref 8.6–10.5)
CASTS URNS MICRO: ABNORMAL
CHLORIDE SERPL-SCNC: 99 MMOL/L (ref 98–107)
CHOLEST SERPL-MCNC: 159 MG/DL (ref 0–200)
CO2 SERPL-SCNC: 29.2 MMOL/L (ref 22–29)
COLOR UR: YELLOW
CREAT SERPL-MCNC: 1.05 MG/DL (ref 0.76–1.27)
EGFRCR SERPLBLD CKD-EPI 2021: 98.5 ML/MIN/1.73
EOSINOPHIL # BLD AUTO: 0.13 10*3/MM3 (ref 0–0.4)
EOSINOPHIL NFR BLD AUTO: 2.6 % (ref 0.3–6.2)
EPI CELLS #/AREA URNS HPF: ABNORMAL /HPF
ERYTHROCYTE [DISTWIDTH] IN BLOOD BY AUTOMATED COUNT: 11.8 % (ref 12.3–15.4)
GLOBULIN SER CALC-MCNC: 2 GM/DL
GLUCOSE SERPL-MCNC: 99 MG/DL (ref 65–99)
GLUCOSE UR QL STRIP: NEGATIVE
HCT VFR BLD AUTO: 42 % (ref 37.5–51)
HCV AB S/CO SERPL IA: <0.1 S/CO RATIO (ref 0–0.9)
HDLC SERPL-MCNC: 50 MG/DL (ref 40–60)
HGB BLD-MCNC: 14.6 G/DL (ref 13–17.7)
HGB UR QL STRIP: NEGATIVE
IMM GRANULOCYTES # BLD AUTO: 0.01 10*3/MM3 (ref 0–0.05)
IMM GRANULOCYTES NFR BLD AUTO: 0.2 % (ref 0–0.5)
KETONES UR QL STRIP: NEGATIVE
LDLC SERPL CALC-MCNC: 67 MG/DL (ref 0–100)
LEUKOCYTE ESTERASE UR QL STRIP: ABNORMAL
LYMPHOCYTES # BLD AUTO: 1.99 10*3/MM3 (ref 0.7–3.1)
LYMPHOCYTES NFR BLD AUTO: 39.4 % (ref 19.6–45.3)
MAGNESIUM SERPL-MCNC: 2 MG/DL (ref 1.6–2.6)
MCH RBC QN AUTO: 29 PG (ref 26.6–33)
MCHC RBC AUTO-ENTMCNC: 34.8 G/DL (ref 31.5–35.7)
MCV RBC AUTO: 83.5 FL (ref 79–97)
MONOCYTES # BLD AUTO: 0.42 10*3/MM3 (ref 0.1–0.9)
MONOCYTES NFR BLD AUTO: 8.3 % (ref 5–12)
NEUTROPHILS # BLD AUTO: 2.48 10*3/MM3 (ref 1.7–7)
NEUTROPHILS NFR BLD AUTO: 49.1 % (ref 42.7–76)
NITRITE UR QL STRIP: NEGATIVE
NRBC BLD AUTO-RTO: 0 /100 WBC (ref 0–0.2)
PH UR STRIP: 7 [PH] (ref 5–8)
PLATELET # BLD AUTO: 285 10*3/MM3 (ref 140–450)
POTASSIUM SERPL-SCNC: 4.2 MMOL/L (ref 3.5–5.2)
PROT SERPL-MCNC: 7 G/DL (ref 6–8.5)
PROT UR QL STRIP: ABNORMAL
RBC # BLD AUTO: 5.03 10*6/MM3 (ref 4.14–5.8)
RBC #/AREA URNS HPF: ABNORMAL /HPF
SODIUM SERPL-SCNC: 138 MMOL/L (ref 136–145)
SP GR UR STRIP: 1.02 (ref 1–1.03)
TRIGL SERPL-MCNC: 260 MG/DL (ref 0–150)
TSH SERPL DL<=0.005 MIU/L-ACNC: 2.27 UIU/ML (ref 0.27–4.2)
UROBILINOGEN UR STRIP-MCNC: ABNORMAL MG/DL
VIT B12 SERPL-MCNC: 648 PG/ML (ref 211–946)
VLDLC SERPL CALC-MCNC: 42 MG/DL (ref 5–40)
WBC # BLD AUTO: 5.05 10*3/MM3 (ref 3.4–10.8)
WBC #/AREA URNS HPF: ABNORMAL /HPF

## 2022-11-25 NOTE — PROGRESS NOTES
Subjective     Chief Complaint:  Restless legs    HPI:  The patient presents to the office today to establish care. His main complaint today is that of restless legs.  He states this has been an issue for about 6 months and seems to be getting worse.  He is able to fall asleep with no problem but states he wakes up nearly every night complaining of tightness in both thigh muscles.  He feels as though he has muscle spasms as well.  He states when this happens he has to get up and walk around and this tends to get better after walking for a couple of minutes.  He is able to go back to sleep and this typically recurs.  He notes that he does kick at night as well.  He does note some tingling to his lower extremities at times as well.  He states he has tried over-the-counter restless leg medications, warm soaks and Epson salt prior to bed, massages, and various muscle creams.  He states none of these have helped.  He states he does have some lower back pain at times, denies any sciatic symptoms.  He does have a physically demanding job and does perform heavy lifting.  He has not noted any back pain as of late.    The patient states he does drink quite a bit of caffeine during the week but does not drink caffeine on the weekends.  He drinks 2 Monster energy drinks during his shifts at work.  He does work for 10-hour shifts per week.    The patient otherwise denies any complaints.  He does today and states he is interested in trying to quit.  He used to smoke 2 packs of cigarettes per day.    Patient's PMR from outside medical facility reviewed and noted.    Past Medical History:   Past Medical History:   Diagnosis Date   • Patient denies medical problems      Past Surgical History:No past surgical history on file.  Social History:  reports that he has been smoking electronic cigarette. He has never used smokeless tobacco. He reports current alcohol use of about 4.0 standard drinks per week. He reports that he does  "not use drugs.    Family History: family history is not on file.      Allergies:  No Known Allergies  Medications:  Prior to Admission medications    Medication Sig Start Date End Date Taking? Authorizing Provider   ibuprofen (ADVIL,MOTRIN) 800 MG tablet Take 1 tablet by mouth Every 8 (Eight) Hours As Needed for mild pain (1-3). 2/25/17   Joan Balderas DO   ondansetron ODT (ZOFRAN-ODT) 4 MG disintegrating tablet Take 1 tablet by mouth Every 6 (Six) Hours As Needed for nausea. 2/14/17   Jackie Stuart APRN   rOPINIRole (Requip) 0.25 MG tablet Take 1 tablet by mouth Every Night. Take 1 hour before bedtime. May increase to 2 tablets after 2 days if not working 11/23/22   Sunitha Jauregui APRN       Objective     Vital Signs: /78 (BP Location: Left arm, Patient Position: Sitting, Cuff Size: Adult)   Pulse 90   Temp 97.3 °F (36.3 °C) (Temporal)   Ht 172.7 cm (68\")   Wt 65.2 kg (143 lb 12.8 oz)   SpO2 99%   BMI 21.86 kg/m²   Physical Exam  Vitals and nursing note reviewed.   Constitutional:       General: He is not in acute distress.     Appearance: He is not ill-appearing or toxic-appearing.   HENT:      Head: Normocephalic and atraumatic.      Mouth/Throat:      Mouth: Mucous membranes are moist.      Pharynx: Oropharynx is clear.   Cardiovascular:      Rate and Rhythm: Normal rate and regular rhythm.      Pulses: Normal pulses.      Heart sounds: Normal heart sounds.   Pulmonary:      Effort: Pulmonary effort is normal.      Breath sounds: No wheezing, rhonchi or rales.   Abdominal:      General: Bowel sounds are normal. There is no distension.      Palpations: Abdomen is soft.      Tenderness: There is no abdominal tenderness.   Musculoskeletal:         General: No swelling or tenderness. Normal range of motion.      Cervical back: Normal range of motion and neck supple. No tenderness.   Skin:     General: Skin is warm and dry.      Findings: No erythema or rash.   Neurological:      " General: No focal deficit present.      Mental Status: He is alert and oriented to person, place, and time.   Psychiatric:         Mood and Affect: Mood normal.         Behavior: Behavior normal.         Thought Content: Thought content normal.         Judgment: Judgment normal.       BMI is within normal parameters. No other follow-up for BMI required.      Assessment / Plan     Assessment/Plan:  Diagnoses and all orders for this visit:    1. Annual physical exam (Primary)  -     Comprehensive Metabolic Panel  -     Lipid Panel  -     CBC & Differential  -     Urinalysis With Microscopic If Indicated (No Culture) - Urine, Clean Catch  -     TSH Rfx On Abnormal To Free T4    2. Need for hepatitis C screening test  -     Hepatitis C Antibody    3. Restless leg syndrome  -     Vitamin B12  -     Magnesium    4. Vapes nicotine containing substance    Other orders  -     rOPINIRole (Requip) 0.25 MG tablet; Take 1 tablet by mouth Every Night. Take 1 hour before bedtime. May increase to 2 tablets after 2 days if not working  Dispense: 60 tablet; Refill: 0  -     Microscopic Examination -       The patient presents to the office today to establish care.  His main concern is that of restless legs at night.  This has been ongoing for 6 months and seems to be getting worse.  He has tried numerous over-the-counter remedies which not helped.  We will trial Requip at 0.25 mg dosing.  I have informed the patient that he may increase this to 2 tablets after 2 days if this is not working.  Short-term follow-up in 2 weeks to assess efficacy.  We will also assess baseline labs today as above.  Given intermittent paresthesias of the lower extremities we will check a vitamin B12 and magnesium level as well.    Return in about 2 weeks (around 12/7/2022). unless patient needs to be seen sooner or acute issues arise.    I have discussed the patient results/orders and and plan/recommendation with them at today's visit.      Sunitha MCKEON  Shania, APRN   11/23/2022

## 2022-11-28 NOTE — PROGRESS NOTES
Labs overall look pretty good. Triglycerides were a little high, but I don't believe he was completely fasting for this so the results may not be completely accurate. In any case, would still recommend lowering saturated fat intake from processed foods and increasing healthy fat intake.

## 2022-12-05 ENCOUNTER — OFFICE VISIT (OUTPATIENT)
Dept: INTERNAL MEDICINE | Facility: CLINIC | Age: 27
End: 2022-12-05

## 2022-12-05 VITALS
DIASTOLIC BLOOD PRESSURE: 74 MMHG | HEIGHT: 68 IN | BODY MASS INDEX: 21.67 KG/M2 | SYSTOLIC BLOOD PRESSURE: 120 MMHG | OXYGEN SATURATION: 98 % | HEART RATE: 75 BPM | TEMPERATURE: 97.1 F | WEIGHT: 143 LBS

## 2022-12-05 DIAGNOSIS — G25.81 RESTLESS LEGS SYNDROME: Primary | ICD-10-CM

## 2022-12-05 PROCEDURE — 99213 OFFICE O/P EST LOW 20 MIN: CPT | Performed by: NURSE PRACTITIONER

## 2022-12-05 RX ORDER — ROPINIROLE 1 MG/1
1 TABLET, FILM COATED ORAL NIGHTLY
Qty: 60 TABLET | Refills: 1 | Status: SHIPPED | OUTPATIENT
Start: 2022-12-05

## 2022-12-05 NOTE — PROGRESS NOTES
"        Subjective     Chief Complaint:  Restless legs    HPI:  The patient presents to the office today for a 2-week follow-up.  He presented on 11/23/2022 to establish care.  He has been having an issue with restless legs for about 6 months.  He is typically able to fall asleep with no problem but wakes up nearly every night on the weekends complaining of tightness in both thigh muscles.  He feels as though he has muscle spasms and has to get up and walk around to relieve this.  He is typically able to go back to sleep but does sometimes have recurrence.  He kicks at night as well.  He has some tingling to his lower extremities but denies numbness.  He has tried numerous over-the-counter medications, warm soaks with Epsom salt prior to bed, massages, and various muscle creams.  He was placed on Requip during his last office visit appointment and states this has not seem to help as he is still waking up at night.  He has been taking melatonin along with this.  He states he sleeps better on the weekdays when he is working, as he believes he is more tired on these days.  The restless leg problem is more of an issue on the weekends when he is not working.  He has tried to exercise on the weekends to \"make my legs tired\".  We did discuss cutting back on caffeine during his last office visit and he states he has done this as well.    Patient's PMR from outside medical facility reviewed and noted.    Past Medical History:   Past Medical History:   Diagnosis Date   • Patient denies medical problems      Past Surgical History:No past surgical history on file.  Social History:  reports that he has been smoking electronic cigarette. He has never used smokeless tobacco. He reports current alcohol use of about 4.0 standard drinks per week. He reports that he does not use drugs.    Family History: family history is not on file.      Allergies:  No Known Allergies  Medications:  Prior to Admission medications    Medication Sig " "Start Date End Date Taking? Authorizing Provider   ibuprofen (ADVIL,MOTRIN) 800 MG tablet Take 1 tablet by mouth Every 8 (Eight) Hours As Needed for mild pain (1-3). 2/25/17  Yes Joan Balderas DO   rOPINIRole (Requip) 1 MG tablet Take 1 tablet by mouth Every Night. Take 1 hour before bedtime. May increase to 2 tablets after 2 days if not working 12/5/22  Yes Sunitha Jauregui APRN   rOPINIRole (Requip) 0.25 MG tablet Take 1 tablet by mouth Every Night. Take 1 hour before bedtime. May increase to 2 tablets after 2 days if not working 11/23/22 12/5/22 Yes Sunitha Jauregui APRN   ondansetron ODT (ZOFRAN-ODT) 4 MG disintegrating tablet Take 1 tablet by mouth Every 6 (Six) Hours As Needed for nausea. 2/14/17   Jackie Stuart APRN       Objective     Vital Signs: /74 (BP Location: Left arm, Patient Position: Sitting, Cuff Size: Adult)   Pulse 75   Temp 97.1 °F (36.2 °C) (Temporal)   Ht 172.7 cm (68\")   Wt 64.9 kg (143 lb)   SpO2 98%   BMI 21.74 kg/m²   Physical Exam  Vitals and nursing note reviewed.   Constitutional:       General: He is not in acute distress.     Appearance: He is not ill-appearing or toxic-appearing.   HENT:      Head: Normocephalic and atraumatic.      Mouth/Throat:      Mouth: Mucous membranes are moist.      Pharynx: Oropharynx is clear.   Cardiovascular:      Rate and Rhythm: Normal rate and regular rhythm.      Pulses: Normal pulses.      Heart sounds: Normal heart sounds.   Pulmonary:      Effort: Pulmonary effort is normal.      Breath sounds: No wheezing, rhonchi or rales.   Abdominal:      General: Bowel sounds are normal. There is no distension.      Palpations: Abdomen is soft.      Tenderness: There is no abdominal tenderness.   Musculoskeletal:         General: No swelling or tenderness. Normal range of motion.      Cervical back: Normal range of motion and neck supple. No tenderness.   Skin:     General: Skin is warm and dry.      Findings: No erythema or " rash.   Neurological:      General: No focal deficit present.      Mental Status: He is alert and oriented to person, place, and time.   Psychiatric:         Mood and Affect: Mood normal.         Behavior: Behavior normal.         Thought Content: Thought content normal.         Judgment: Judgment normal.       BMI is within normal parameters. No other follow-up for BMI required.    Results Reviewed:  Reviewed last office visit note from 11/23/2022.    Assessment / Plan     Assessment/Plan:  Diagnoses and all orders for this visit:    1. Restless legs syndrome (Primary)    Other orders  -     rOPINIRole (Requip) 1 MG tablet; Take 1 tablet by mouth Every Night. Take 1 hour before bedtime. May increase to 2 tablets after 2 days if not working  Dispense: 60 tablet; Refill: 1       Patient presents for 2-week follow-up regarding restless leg syndrome.  During his last office visit, we did check labs, which were unremarkable.  Vitamin B12, magnesium, TSH were within normal limits.  Patient had been placed on Requip, which she states has not helped much.  We will increase his dosage from 0.5 mg to 1 mg.  I have instructed him to call the office in 1 week if this has not been effective.    Return in about 4 weeks (around 1/2/2023). unless patient needs to be seen sooner or acute issues arise.    I have discussed the patient results/orders and and plan/recommendation with them at today's visit.      Sunitha Jauregui, KEMAR   12/05/2022

## 2022-12-08 ENCOUNTER — PATIENT ROUNDING (BHMG ONLY) (OUTPATIENT)
Dept: INTERNAL MEDICINE | Facility: CLINIC | Age: 27
End: 2022-12-08

## 2022-12-08 NOTE — PROGRESS NOTES
December 8, 2022    Hello, may I speak with Daniele Olmstead?    My name is Chantal      I am  with [facility]  [address] [place] [address] [address] [place] [address] [phone].    Before we get started may I verify your date of birth? 1995    I am calling to officially welcome you to our practice and ask about your recent visit. Is this a good time to talk? No  No answer left voicemail

## 2024-10-29 ENCOUNTER — TELEPHONE (OUTPATIENT)
Dept: PRIMARY CARE CLINIC | Age: 29
End: 2024-10-29

## 2024-10-29 NOTE — TELEPHONE ENCOUNTER
----- Message from Dr. Anabel Molina MD sent at 10/28/2024  8:25 AM CDT -----  Please schedule a follow-up visit as soon as possible